# Patient Record
Sex: MALE | Race: WHITE | Employment: OTHER | URBAN - METROPOLITAN AREA
[De-identification: names, ages, dates, MRNs, and addresses within clinical notes are randomized per-mention and may not be internally consistent; named-entity substitution may affect disease eponyms.]

---

## 2024-02-09 ENCOUNTER — APPOINTMENT (OUTPATIENT)
Dept: GENERAL RADIOLOGY | Age: 74
DRG: 854 | End: 2024-02-09
Payer: MEDICARE

## 2024-02-09 ENCOUNTER — HOSPITAL ENCOUNTER (INPATIENT)
Age: 74
LOS: 13 days | Discharge: HOME | DRG: 854 | End: 2024-02-23
Attending: EMERGENCY MEDICINE | Admitting: INTERNAL MEDICINE
Payer: MEDICARE

## 2024-02-09 DIAGNOSIS — R65.20 SEVERE SEPSIS (HCC): Primary | ICD-10-CM

## 2024-02-09 DIAGNOSIS — I96 GANGRENE (HCC): ICD-10-CM

## 2024-02-09 DIAGNOSIS — L03.115 CELLULITIS OF RIGHT FOOT: ICD-10-CM

## 2024-02-09 DIAGNOSIS — M86.9 OSTEOMYELITIS OF THIRD TOE OF RIGHT FOOT (HCC): ICD-10-CM

## 2024-02-09 DIAGNOSIS — A41.9 SEVERE SEPSIS (HCC): Primary | ICD-10-CM

## 2024-02-09 DIAGNOSIS — I96 GANGRENE OF RIGHT FOOT (HCC): ICD-10-CM

## 2024-02-09 LAB
BASOPHILS # BLD: 0 K/UL (ref 0–0.2)
BASOPHILS NFR BLD: 0.1 %
DEPRECATED RDW RBC AUTO: 14.2 % (ref 12.4–15.4)
EOSINOPHIL # BLD: 0 K/UL (ref 0–0.6)
EOSINOPHIL NFR BLD: 0.1 %
ERYTHROCYTE [SEDIMENTATION RATE] IN BLOOD BY WESTERGREN METHOD: 107 MM/HR (ref 0–20)
HCT VFR BLD AUTO: 28.3 % (ref 40.5–52.5)
HGB BLD-MCNC: 9.2 G/DL (ref 13.5–17.5)
LACTATE BLDV-SCNC: 2.8 MMOL/L (ref 0.4–1.9)
LYMPHOCYTES # BLD: 1.6 K/UL (ref 1–5.1)
LYMPHOCYTES NFR BLD: 5.4 %
MCH RBC QN AUTO: 29.4 PG (ref 26–34)
MCHC RBC AUTO-ENTMCNC: 32.5 G/DL (ref 31–36)
MCV RBC AUTO: 90.5 FL (ref 80–100)
MONOCYTES # BLD: 2.5 K/UL (ref 0–1.3)
MONOCYTES NFR BLD: 8.6 %
NEUTROPHILS # BLD: 25.3 K/UL (ref 1.7–7.7)
NEUTROPHILS NFR BLD: 85.8 %
PLATELET # BLD AUTO: 670 K/UL (ref 135–450)
PMV BLD AUTO: 7.1 FL (ref 5–10.5)
RBC # BLD AUTO: 3.13 M/UL (ref 4.2–5.9)
WBC # BLD AUTO: 29.4 K/UL (ref 4–11)

## 2024-02-09 PROCEDURE — 86140 C-REACTIVE PROTEIN: CPT

## 2024-02-09 PROCEDURE — 99285 EMERGENCY DEPT VISIT HI MDM: CPT

## 2024-02-09 PROCEDURE — 73630 X-RAY EXAM OF FOOT: CPT

## 2024-02-09 PROCEDURE — 87040 BLOOD CULTURE FOR BACTERIA: CPT

## 2024-02-09 PROCEDURE — 85652 RBC SED RATE AUTOMATED: CPT

## 2024-02-09 PROCEDURE — 85025 COMPLETE CBC W/AUTO DIFF WBC: CPT

## 2024-02-09 PROCEDURE — 96365 THER/PROPH/DIAG IV INF INIT: CPT

## 2024-02-09 PROCEDURE — 80053 COMPREHEN METABOLIC PANEL: CPT

## 2024-02-09 PROCEDURE — 83605 ASSAY OF LACTIC ACID: CPT

## 2024-02-09 PROCEDURE — 6360000002 HC RX W HCPCS: Performed by: PHYSICIAN ASSISTANT

## 2024-02-09 PROCEDURE — 2580000003 HC RX 258: Performed by: PHYSICIAN ASSISTANT

## 2024-02-09 RX ORDER — 0.9 % SODIUM CHLORIDE 0.9 %
1000 INTRAVENOUS SOLUTION INTRAVENOUS ONCE
Status: DISCONTINUED | OUTPATIENT
Start: 2024-02-09 | End: 2024-02-10

## 2024-02-09 RX ADMIN — VANCOMYCIN HYDROCHLORIDE 1000 MG: 1 INJECTION, POWDER, LYOPHILIZED, FOR SOLUTION INTRAVENOUS at 23:48

## 2024-02-09 ASSESSMENT — PAIN SCALES - GENERAL: PAINLEVEL_OUTOF10: 6

## 2024-02-09 ASSESSMENT — PAIN - FUNCTIONAL ASSESSMENT: PAIN_FUNCTIONAL_ASSESSMENT: 0-10

## 2024-02-10 PROBLEM — D72.9 NEUTROPHILIA: Status: ACTIVE | Noted: 2024-02-10

## 2024-02-10 PROBLEM — F17.200 SMOKING: Status: ACTIVE | Noted: 2024-02-10

## 2024-02-10 PROBLEM — I73.9 PVD (PERIPHERAL VASCULAR DISEASE) (HCC): Status: ACTIVE | Noted: 2024-02-10

## 2024-02-10 PROBLEM — L02.619 CELLULITIS AND ABSCESS OF FOOT: Status: ACTIVE | Noted: 2024-02-10

## 2024-02-10 PROBLEM — L08.9 TYPE 2 DIABETES MELLITUS WITH RIGHT DIABETIC FOOT INFECTION (HCC): Status: ACTIVE | Noted: 2024-02-10

## 2024-02-10 PROBLEM — E11.9 DIABETES MELLITUS, NEW ONSET (HCC): Status: ACTIVE | Noted: 2024-02-10

## 2024-02-10 PROBLEM — R65.20 SEVERE SEPSIS (HCC): Status: ACTIVE | Noted: 2024-02-10

## 2024-02-10 PROBLEM — M86.9 OSTEOMYELITIS OF THIRD TOE OF RIGHT FOOT (HCC): Status: ACTIVE | Noted: 2024-02-10

## 2024-02-10 PROBLEM — A41.9 SEVERE SEPSIS (HCC): Status: ACTIVE | Noted: 2024-02-10

## 2024-02-10 PROBLEM — A41.9 SEPSIS (HCC): Status: ACTIVE | Noted: 2024-02-10

## 2024-02-10 PROBLEM — E11.628 TYPE 2 DIABETES MELLITUS WITH RIGHT DIABETIC FOOT INFECTION (HCC): Status: ACTIVE | Noted: 2024-02-10

## 2024-02-10 PROBLEM — L03.119 CELLULITIS AND ABSCESS OF FOOT: Status: ACTIVE | Noted: 2024-02-10

## 2024-02-10 PROBLEM — L03.115 CELLULITIS OF RIGHT FOOT: Status: ACTIVE | Noted: 2024-02-10

## 2024-02-10 PROBLEM — D72.828 NEUTROPHILIA: Status: ACTIVE | Noted: 2024-02-10

## 2024-02-10 LAB
ALBUMIN SERPL-MCNC: 2.3 G/DL (ref 3.4–5)
ALBUMIN SERPL-MCNC: 2.7 G/DL (ref 3.4–5)
ALBUMIN/GLOB SERPL: 0.5 {RATIO} (ref 1.1–2.2)
ALBUMIN/GLOB SERPL: 0.5 {RATIO} (ref 1.1–2.2)
ALP SERPL-CCNC: 163 U/L (ref 40–129)
ALP SERPL-CCNC: 217 U/L (ref 40–129)
ALT SERPL-CCNC: 20 U/L (ref 10–40)
ALT SERPL-CCNC: 24 U/L (ref 10–40)
ANION GAP SERPL CALCULATED.3IONS-SCNC: 12 MMOL/L (ref 3–16)
ANION GAP SERPL CALCULATED.3IONS-SCNC: 9 MMOL/L (ref 3–16)
AST SERPL-CCNC: 26 U/L (ref 15–37)
AST SERPL-CCNC: 36 U/L (ref 15–37)
BASOPHILS # BLD: 0 K/UL (ref 0–0.2)
BASOPHILS NFR BLD: 0.1 %
BILIRUB SERPL-MCNC: 0.8 MG/DL (ref 0–1)
BILIRUB SERPL-MCNC: 0.8 MG/DL (ref 0–1)
BUN SERPL-MCNC: 26 MG/DL (ref 7–20)
BUN SERPL-MCNC: 26 MG/DL (ref 7–20)
CALCIUM SERPL-MCNC: 8.4 MG/DL (ref 8.3–10.6)
CALCIUM SERPL-MCNC: 9.1 MG/DL (ref 8.3–10.6)
CHLORIDE SERPL-SCNC: 103 MMOL/L (ref 99–110)
CHLORIDE SERPL-SCNC: 99 MMOL/L (ref 99–110)
CO2 SERPL-SCNC: 27 MMOL/L (ref 21–32)
CO2 SERPL-SCNC: 28 MMOL/L (ref 21–32)
CREAT SERPL-MCNC: 0.8 MG/DL (ref 0.8–1.3)
CREAT SERPL-MCNC: 0.9 MG/DL (ref 0.8–1.3)
CRP SERPL-MCNC: 288.8 MG/L (ref 0–5.1)
DEPRECATED RDW RBC AUTO: 13.9 % (ref 12.4–15.4)
EKG ATRIAL RATE: 103 BPM
EKG DIAGNOSIS: NORMAL
EKG P AXIS: 79 DEGREES
EKG P-R INTERVAL: 144 MS
EKG Q-T INTERVAL: 356 MS
EKG QRS DURATION: 100 MS
EKG QTC CALCULATION (BAZETT): 466 MS
EKG R AXIS: 37 DEGREES
EKG T AXIS: 65 DEGREES
EKG VENTRICULAR RATE: 103 BPM
EOSINOPHIL # BLD: 0 K/UL (ref 0–0.6)
EOSINOPHIL NFR BLD: 0 %
EST. AVERAGE GLUCOSE BLD GHB EST-MCNC: 165.7 MG/DL
GFR SERPLBLD CREATININE-BSD FMLA CKD-EPI: >60 ML/MIN/{1.73_M2}
GFR SERPLBLD CREATININE-BSD FMLA CKD-EPI: >60 ML/MIN/{1.73_M2}
GLUCOSE BLD-MCNC: 172 MG/DL (ref 70–99)
GLUCOSE BLD-MCNC: 234 MG/DL (ref 70–99)
GLUCOSE BLD-MCNC: 242 MG/DL (ref 70–99)
GLUCOSE BLD-MCNC: 247 MG/DL (ref 70–99)
GLUCOSE SERPL-MCNC: 157 MG/DL (ref 70–99)
GLUCOSE SERPL-MCNC: 289 MG/DL (ref 70–99)
HBA1C MFR BLD: 7.4 %
HCT VFR BLD AUTO: 24.5 % (ref 40.5–52.5)
HGB BLD-MCNC: 7.9 G/DL (ref 13.5–17.5)
LACTATE BLDV-SCNC: 1.3 MMOL/L (ref 0.4–1.9)
LACTATE BLDV-SCNC: 1.5 MMOL/L (ref 0.4–1.9)
LYMPHOCYTES # BLD: 1.1 K/UL (ref 1–5.1)
LYMPHOCYTES NFR BLD: 3.7 %
MCH RBC QN AUTO: 29.6 PG (ref 26–34)
MCHC RBC AUTO-ENTMCNC: 32.4 G/DL (ref 31–36)
MCV RBC AUTO: 91.2 FL (ref 80–100)
MONOCYTES # BLD: 2.2 K/UL (ref 0–1.3)
MONOCYTES NFR BLD: 7.4 %
NEUTROPHILS # BLD: 25.9 K/UL (ref 1.7–7.7)
NEUTROPHILS NFR BLD: 88.8 %
PERFORMED ON: ABNORMAL
PLATELET # BLD AUTO: 542 K/UL (ref 135–450)
PMV BLD AUTO: 7.1 FL (ref 5–10.5)
POTASSIUM SERPL-SCNC: 3.9 MMOL/L (ref 3.5–5.1)
POTASSIUM SERPL-SCNC: 4.4 MMOL/L (ref 3.5–5.1)
PROT SERPL-MCNC: 6.9 G/DL (ref 6.4–8.2)
PROT SERPL-MCNC: 8 G/DL (ref 6.4–8.2)
RBC # BLD AUTO: 2.69 M/UL (ref 4.2–5.9)
SODIUM SERPL-SCNC: 139 MMOL/L (ref 136–145)
SODIUM SERPL-SCNC: 139 MMOL/L (ref 136–145)
VANCOMYCIN SERPL-MCNC: 8.2 UG/ML
WBC # BLD AUTO: 29.2 K/UL (ref 4–11)

## 2024-02-10 PROCEDURE — 2580000003 HC RX 258: Performed by: STUDENT IN AN ORGANIZED HEALTH CARE EDUCATION/TRAINING PROGRAM

## 2024-02-10 PROCEDURE — 85025 COMPLETE CBC W/AUTO DIFF WBC: CPT

## 2024-02-10 PROCEDURE — 83036 HEMOGLOBIN GLYCOSYLATED A1C: CPT

## 2024-02-10 PROCEDURE — 6370000000 HC RX 637 (ALT 250 FOR IP): Performed by: STUDENT IN AN ORGANIZED HEALTH CARE EDUCATION/TRAINING PROGRAM

## 2024-02-10 PROCEDURE — 87186 SC STD MICRODIL/AGAR DIL: CPT

## 2024-02-10 PROCEDURE — 2580000003 HC RX 258: Performed by: PHYSICIAN ASSISTANT

## 2024-02-10 PROCEDURE — 80053 COMPREHEN METABOLIC PANEL: CPT

## 2024-02-10 PROCEDURE — 83605 ASSAY OF LACTIC ACID: CPT

## 2024-02-10 PROCEDURE — 99223 1ST HOSP IP/OBS HIGH 75: CPT | Performed by: INTERNAL MEDICINE

## 2024-02-10 PROCEDURE — 6360000002 HC RX W HCPCS: Performed by: STUDENT IN AN ORGANIZED HEALTH CARE EDUCATION/TRAINING PROGRAM

## 2024-02-10 PROCEDURE — 87070 CULTURE OTHR SPECIMN AEROBIC: CPT

## 2024-02-10 PROCEDURE — 6370000000 HC RX 637 (ALT 250 FOR IP): Performed by: PHYSICIAN ASSISTANT

## 2024-02-10 PROCEDURE — 6360000002 HC RX W HCPCS: Performed by: INTERNAL MEDICINE

## 2024-02-10 PROCEDURE — 93010 ELECTROCARDIOGRAM REPORT: CPT | Performed by: INTERNAL MEDICINE

## 2024-02-10 PROCEDURE — 87205 SMEAR GRAM STAIN: CPT

## 2024-02-10 PROCEDURE — 6360000002 HC RX W HCPCS: Performed by: PHYSICIAN ASSISTANT

## 2024-02-10 PROCEDURE — 87075 CULTR BACTERIA EXCEPT BLOOD: CPT

## 2024-02-10 PROCEDURE — 36415 COLL VENOUS BLD VENIPUNCTURE: CPT

## 2024-02-10 PROCEDURE — 87077 CULTURE AEROBIC IDENTIFY: CPT

## 2024-02-10 PROCEDURE — 1200000000 HC SEMI PRIVATE

## 2024-02-10 PROCEDURE — 80202 ASSAY OF VANCOMYCIN: CPT

## 2024-02-10 PROCEDURE — 93005 ELECTROCARDIOGRAM TRACING: CPT | Performed by: STUDENT IN AN ORGANIZED HEALTH CARE EDUCATION/TRAINING PROGRAM

## 2024-02-10 RX ORDER — ACETAMINOPHEN 325 MG/1
650 TABLET ORAL EVERY 6 HOURS PRN
Status: DISCONTINUED | OUTPATIENT
Start: 2024-02-10 | End: 2024-02-23 | Stop reason: HOSPADM

## 2024-02-10 RX ORDER — NICOTINE 21 MG/24HR
1 PATCH, TRANSDERMAL 24 HOURS TRANSDERMAL ONCE
Status: COMPLETED | OUTPATIENT
Start: 2024-02-10 | End: 2024-02-11

## 2024-02-10 RX ORDER — SODIUM CHLORIDE 0.9 % (FLUSH) 0.9 %
5-40 SYRINGE (ML) INJECTION EVERY 12 HOURS SCHEDULED
Status: DISCONTINUED | OUTPATIENT
Start: 2024-02-10 | End: 2024-02-23 | Stop reason: HOSPADM

## 2024-02-10 RX ORDER — IPRATROPIUM BROMIDE AND ALBUTEROL SULFATE 2.5; .5 MG/3ML; MG/3ML
1 SOLUTION RESPIRATORY (INHALATION)
Status: DISCONTINUED | OUTPATIENT
Start: 2024-02-10 | End: 2024-02-10

## 2024-02-10 RX ORDER — ACETAMINOPHEN 650 MG/1
650 SUPPOSITORY RECTAL EVERY 6 HOURS PRN
Status: DISCONTINUED | OUTPATIENT
Start: 2024-02-10 | End: 2024-02-23 | Stop reason: HOSPADM

## 2024-02-10 RX ORDER — SODIUM CHLORIDE 9 MG/ML
INJECTION, SOLUTION INTRAVENOUS CONTINUOUS
Status: ACTIVE | OUTPATIENT
Start: 2024-02-10 | End: 2024-02-11

## 2024-02-10 RX ORDER — METRONIDAZOLE 500 MG/100ML
500 INJECTION, SOLUTION INTRAVENOUS EVERY 8 HOURS SCHEDULED
Status: DISCONTINUED | OUTPATIENT
Start: 2024-02-10 | End: 2024-02-15

## 2024-02-10 RX ORDER — IPRATROPIUM BROMIDE AND ALBUTEROL SULFATE 2.5; .5 MG/3ML; MG/3ML
1 SOLUTION RESPIRATORY (INHALATION)
Status: DISCONTINUED | OUTPATIENT
Start: 2024-02-10 | End: 2024-02-11

## 2024-02-10 RX ORDER — 0.9 % SODIUM CHLORIDE 0.9 %
30 INTRAVENOUS SOLUTION INTRAVENOUS ONCE
Status: COMPLETED | OUTPATIENT
Start: 2024-02-10 | End: 2024-02-10

## 2024-02-10 RX ORDER — SODIUM CHLORIDE 0.9 % (FLUSH) 0.9 %
5-40 SYRINGE (ML) INJECTION PRN
Status: DISCONTINUED | OUTPATIENT
Start: 2024-02-10 | End: 2024-02-23 | Stop reason: HOSPADM

## 2024-02-10 RX ORDER — DEXTROSE MONOHYDRATE 100 MG/ML
INJECTION, SOLUTION INTRAVENOUS CONTINUOUS PRN
Status: DISCONTINUED | OUTPATIENT
Start: 2024-02-10 | End: 2024-02-23 | Stop reason: HOSPADM

## 2024-02-10 RX ORDER — MORPHINE SULFATE 4 MG/ML
4 INJECTION, SOLUTION INTRAMUSCULAR; INTRAVENOUS
Status: DISCONTINUED | OUTPATIENT
Start: 2024-02-10 | End: 2024-02-11

## 2024-02-10 RX ORDER — SODIUM CHLORIDE 9 MG/ML
INJECTION, SOLUTION INTRAVENOUS PRN
Status: DISCONTINUED | OUTPATIENT
Start: 2024-02-10 | End: 2024-02-23 | Stop reason: HOSPADM

## 2024-02-10 RX ORDER — POLYETHYLENE GLYCOL 3350 17 G/17G
17 POWDER, FOR SOLUTION ORAL DAILY PRN
Status: DISCONTINUED | OUTPATIENT
Start: 2024-02-10 | End: 2024-02-23 | Stop reason: HOSPADM

## 2024-02-10 RX ORDER — GLUCAGON 1 MG/ML
1 KIT INJECTION PRN
Status: DISCONTINUED | OUTPATIENT
Start: 2024-02-10 | End: 2024-02-23 | Stop reason: HOSPADM

## 2024-02-10 RX ORDER — INSULIN LISPRO 100 [IU]/ML
0-4 INJECTION, SOLUTION INTRAVENOUS; SUBCUTANEOUS
Status: DISCONTINUED | OUTPATIENT
Start: 2024-02-10 | End: 2024-02-23 | Stop reason: HOSPADM

## 2024-02-10 RX ORDER — FLUTICASONE PROPIONATE 50 MCG
1 SPRAY, SUSPENSION (ML) NASAL DAILY
Status: DISCONTINUED | OUTPATIENT
Start: 2024-02-10 | End: 2024-02-23 | Stop reason: HOSPADM

## 2024-02-10 RX ORDER — ONDANSETRON 2 MG/ML
4 INJECTION INTRAMUSCULAR; INTRAVENOUS EVERY 6 HOURS PRN
Status: DISCONTINUED | OUTPATIENT
Start: 2024-02-10 | End: 2024-02-23 | Stop reason: HOSPADM

## 2024-02-10 RX ORDER — ENOXAPARIN SODIUM 100 MG/ML
40 INJECTION SUBCUTANEOUS DAILY
Status: DISCONTINUED | OUTPATIENT
Start: 2024-02-10 | End: 2024-02-12

## 2024-02-10 RX ORDER — NICOTINE 21 MG/24HR
1 PATCH, TRANSDERMAL 24 HOURS TRANSDERMAL DAILY
Status: DISCONTINUED | OUTPATIENT
Start: 2024-02-10 | End: 2024-02-23 | Stop reason: HOSPADM

## 2024-02-10 RX ORDER — INSULIN LISPRO 100 [IU]/ML
0-4 INJECTION, SOLUTION INTRAVENOUS; SUBCUTANEOUS NIGHTLY
Status: DISCONTINUED | OUTPATIENT
Start: 2024-02-10 | End: 2024-02-23 | Stop reason: HOSPADM

## 2024-02-10 RX ORDER — MORPHINE SULFATE 2 MG/ML
2 INJECTION, SOLUTION INTRAMUSCULAR; INTRAVENOUS
Status: DISCONTINUED | OUTPATIENT
Start: 2024-02-10 | End: 2024-02-11

## 2024-02-10 RX ORDER — ONDANSETRON 4 MG/1
4 TABLET, ORALLY DISINTEGRATING ORAL EVERY 8 HOURS PRN
Status: DISCONTINUED | OUTPATIENT
Start: 2024-02-10 | End: 2024-02-23 | Stop reason: HOSPADM

## 2024-02-10 RX ADMIN — PIPERACILLIN AND TAZOBACTAM 4500 MG: 4; .5 INJECTION, POWDER, FOR SOLUTION INTRAVENOUS at 03:12

## 2024-02-10 RX ADMIN — CEFEPIME 2000 MG: 2 INJECTION, POWDER, FOR SOLUTION INTRAVENOUS at 10:59

## 2024-02-10 RX ADMIN — POLYETHYLENE GLYCOL 3350 17 G: 17 POWDER, FOR SOLUTION ORAL at 10:59

## 2024-02-10 RX ADMIN — MORPHINE SULFATE 2 MG: 2 INJECTION, SOLUTION INTRAMUSCULAR; INTRAVENOUS at 08:35

## 2024-02-10 RX ADMIN — MORPHINE SULFATE 2 MG: 2 INJECTION, SOLUTION INTRAMUSCULAR; INTRAVENOUS at 18:40

## 2024-02-10 RX ADMIN — SODIUM CHLORIDE, PRESERVATIVE FREE 10 ML: 5 INJECTION INTRAVENOUS at 21:47

## 2024-02-10 RX ADMIN — MORPHINE SULFATE 2 MG: 2 INJECTION, SOLUTION INTRAMUSCULAR; INTRAVENOUS at 21:48

## 2024-02-10 RX ADMIN — METRONIDAZOLE 500 MG: 500 INJECTION, SOLUTION INTRAVENOUS at 17:33

## 2024-02-10 RX ADMIN — MORPHINE SULFATE 4 MG: 4 INJECTION, SOLUTION INTRAMUSCULAR; INTRAVENOUS at 11:40

## 2024-02-10 RX ADMIN — VANCOMYCIN HYDROCHLORIDE 750 MG: 750 INJECTION, POWDER, LYOPHILIZED, FOR SOLUTION INTRAVENOUS at 11:52

## 2024-02-10 RX ADMIN — MORPHINE SULFATE 2 MG: 2 INJECTION, SOLUTION INTRAMUSCULAR; INTRAVENOUS at 06:16

## 2024-02-10 RX ADMIN — CEFEPIME 2000 MG: 2 INJECTION, POWDER, FOR SOLUTION INTRAVENOUS at 18:45

## 2024-02-10 RX ADMIN — FLUTICASONE PROPIONATE 1 SPRAY: 50 SPRAY, METERED NASAL at 08:04

## 2024-02-10 RX ADMIN — SODIUM CHLORIDE: 9 INJECTION, SOLUTION INTRAVENOUS at 21:55

## 2024-02-10 RX ADMIN — METRONIDAZOLE 500 MG: 500 INJECTION, SOLUTION INTRAVENOUS at 21:56

## 2024-02-10 RX ADMIN — MORPHINE SULFATE 2 MG: 2 INJECTION, SOLUTION INTRAMUSCULAR; INTRAVENOUS at 15:23

## 2024-02-10 RX ADMIN — SODIUM CHLORIDE 2040 ML: 9 INJECTION, SOLUTION INTRAVENOUS at 00:17

## 2024-02-10 RX ADMIN — ENOXAPARIN SODIUM 40 MG: 100 INJECTION SUBCUTANEOUS at 08:06

## 2024-02-10 ASSESSMENT — PAIN DESCRIPTION - ORIENTATION
ORIENTATION: RIGHT
ORIENTATION: LOWER;RIGHT
ORIENTATION: RIGHT

## 2024-02-10 ASSESSMENT — PAIN SCALES - GENERAL
PAINLEVEL_OUTOF10: 4
PAINLEVEL_OUTOF10: 7
PAINLEVEL_OUTOF10: 5
PAINLEVEL_OUTOF10: 6
PAINLEVEL_OUTOF10: 6
PAINLEVEL_OUTOF10: 5
PAINLEVEL_OUTOF10: 5
PAINLEVEL_OUTOF10: 8
PAINLEVEL_OUTOF10: 3
PAINLEVEL_OUTOF10: 2

## 2024-02-10 ASSESSMENT — PAIN DESCRIPTION - LOCATION
LOCATION: LEG
LOCATION: LEG
LOCATION: FOOT
LOCATION: FOOT

## 2024-02-10 ASSESSMENT — PAIN DESCRIPTION - ONSET: ONSET: ON-GOING

## 2024-02-10 ASSESSMENT — PAIN DESCRIPTION - DESCRIPTORS
DESCRIPTORS: ACHING
DESCRIPTORS: BURNING;ACHING

## 2024-02-10 ASSESSMENT — PAIN DESCRIPTION - PAIN TYPE: TYPE: ACUTE PAIN

## 2024-02-10 ASSESSMENT — PAIN - FUNCTIONAL ASSESSMENT: PAIN_FUNCTIONAL_ASSESSMENT: PREVENTS OR INTERFERES SOME ACTIVE ACTIVITIES AND ADLS

## 2024-02-10 ASSESSMENT — PAIN DESCRIPTION - FREQUENCY: FREQUENCY: CONTINUOUS

## 2024-02-10 NOTE — ED PROVIDER NOTES
[facility]  ED  EMERGENCY DEPARTMENT ENCOUNTER        Pt Name: Jackson Martínez  MRN: [id number]  Birthdate 1950  Date of evaluation: 2/9/2024  Provider: FRANKI HERNANDEZ PA-C  PCP: No primary care provider on file.  ED Attending: Laura Ramirez MD       I have seen and evaluated this patient with my supervising physician Laura Ramirez MD.    CHIEF COMPLAINT:     Chief Complaint   Patient presents with    Wound Infection     A gangrene toe/foot right.  Pt states he used toenail clippers on second toe 2 weeks ago now a \"bad infection\".         HISTORY OF PRESENT ILLNESS:      History provided by the patient. No limitations.    Jackson Martínez is a 73 y.o. male who arrives to the ED by EMS from home.  Patient lives with his wife.  Patient is here with pain, swelling, discoloration, drainage and foul smell from right foot.  He reports this has been going on for a few weeks.  He has been applying antibiotic ointment and trying to keep it clean.  On arrival he has dried, crusted paper towels tucked between his toes.  He is concerned about \"gangrene\".  Patient denies medical history though admittedly has not been to a doctor in about a decade.     Nursing Notes were reviewed     REVIEW OF SYSTEMS:     Review of Systems  Positives and pertinent negatives as per HPI.      PAST MEDICAL HISTORY:     Past Medical History:   Diagnosis Date    Bronchitis        SURGICAL HISTORY:      Past Surgical History:   Procedure Laterality Date    ADENOIDECTOMY      NASAL SEPTUM SURGERY      SPLENECTOMY      TONSILLECTOMY         CURRENT MEDICATIONS:       Previous Medications    FLUTICASONE (FLONASE) 50 MCG/ACT NASAL SPRAY    1 spray by Nasal route daily.    FLUTICASONE-SALMETEROL (ADVAIR) 100-50 MCG/DOSE DISKUS INHALER    Inhale 1 puff into the lungs every 12 hours.    IPRATROPIUM-ALBUTEROL (DUONEB) 0.5-2.5 (3) MG/3ML SOLN NEBULIZER SOLUTION    Inhale 3 mLs into the lungs every 4 hours.    METHOCARBAMOL

## 2024-02-10 NOTE — CONSULTS
Pharmacy Note  Vancomycin Consult    Jackson Martínez is a 73 y.o. male started on Vancomycin for SSTI; consult received from Dr. Hardin to manage therapy. Also receiving the following antibiotics: zosyn.    Allergies:  Flexeril [cyclobenzaprine] and Tramadol       Recent Labs     02/09/24  2335   CREATININE 0.9       Recent Labs     02/09/24  2335   WBC 29.4*       Estimated Creatinine Clearance: 71 mL/min (based on SCr of 0.9 mg/dL).    No intake or output data in the 24 hours ending 02/10/24 0349    Wt Readings from Last 1 Encounters:   02/09/24 68 kg (150 lb)         Body mass index is 22.15 kg/m².    Loading dose (critically ill or in ICU, require dialysis or renal replacement therapy): Vancomycin 25 mg/kg IVPB x 1 (maximum 2500 mg).  Maintenance dose: 10-20 mg/kg (maximum: 2000 mg/dose and 4500 mg/day) starting at the next dosing interval determined by renal function  Pulse dose: fluctuating renal function, PRINCESS, ESRD  CRRT: 7.5-10 mg/kg q12h   Goal Vancomycin trough: 15-20 mcg/mL   Goal Vancomycin AUC: 400-600     Assessment/Plan:  Will initiate Vancomycin with a one time loading dose of 1000 mg x1, followed by 750 mg IV every 12 hours. Calculated Vancomycin AUC = 447 mg/L*h with an estimated steady-state vancomycin trough = 12.8 mcg/mL. Vancomycin level ordered for 2100. Timing of trough level will be determined based on culture results, renal function, and clinical response.     Gaurang Higgins PharmD 2/10/2024  3:50 AM

## 2024-02-10 NOTE — RT PROTOCOL NOTE
RT Inhaler-Nebulizer Bronchodilator Protocol Note    There is a bronchodilator order in the chart from a provider indicating to follow the RT Bronchodilator Protocol and there is an “Initiate RT Inhaler-Nebulizer Bronchodilator Protocol” order as well (see protocol at bottom of note).    CXR Findings:  No results found.    The findings from the last RT Protocol Assessment were as follows:   History Pulmonary Disease: Smoker 15 pack years or more  Respiratory Pattern: Regular pattern and RR 12-20 bpm  Breath Sounds: Slightly diminished and/or crackles  Cough: Strong, spontaneous, non-productive  Indication for Bronchodilator Therapy: On home bronchodilators  Bronchodilator Assessment Score: 3    Aerosolized bronchodilator medication orders have been revised according to the RT Inhaler-Nebulizer Bronchodilator Protocol below.    Respiratory Therapist to perform RT Therapy Protocol Assessment initially then follow the protocol.  Repeat RT Therapy Protocol Assessment PRN for score 0-3 or on second treatment, BID, and PRN for scores above 3.    No Indications - adjust the frequency to every 6 hours PRN wheezing or bronchospasm, if no treatments needed after 48 hours then discontinue using Per Protocol order mode.     If indication present, adjust the RT bronchodilator orders based on the Bronchodilator Assessment Score as indicated below.  Use Inhaler orders unless patient has one or more of the following: on home nebulizer, not able to hold breath for 10 seconds, is not alert and oriented, cannot activate and use MDI correctly, or respiratory rate 25 breaths per minute or more, then use the equivalent nebulizer order(s) with same Frequency and PRN reasons based on the score.  If a patient is on this medication at home then do not decrease Frequency below that used at home.    0-3 - enter or revise RT bronchodilator order(s) to equivalent RT Bronchodilator order with Frequency of every 4 hours PRN for wheezing or  increased work of breathing using Per Protocol order mode.        4-6 - enter or revise RT Bronchodilator order(s) to two equivalent RT bronchodilator orders with one order with BID Frequency and one order with Frequency of every 4 hours PRN wheezing or increased work of breathing using Per Protocol order mode.        7-10 - enter or revise RT Bronchodilator order(s) to two equivalent RT bronchodilator orders with one order with TID Frequency and one order with Frequency of every 4 hours PRN wheezing or increased work of breathing using Per Protocol order mode.       11-13 - enter or revise RT Bronchodilator order(s) to one equivalent RT bronchodilator order with QID Frequency and an Albuterol order with Frequency of every 4 hours PRN wheezing or increased work of breathing using Per Protocol order mode.      Greater than 13 - enter or revise RT Bronchodilator order(s) to one equivalent RT bronchodilator order with every 4 hours Frequency and an Albuterol order with Frequency of every 2 hours PRN wheezing or increased work of breathing using Per Protocol order mode.     RT to enter RT Home Evaluation for COPD & MDI Assessment order using Per Protocol order mode.    Electronically signed by william zapata RCP on 2/10/2024 at 4:30 AM

## 2024-02-10 NOTE — CONSULTS
Infectious Diseases Inpatient Consult Note      Reason for Consult:  Sepsis, complicated Rt diabetic foot infection and osteomyelitis     Requesting Physician:  Dr. POONAM Parsons     Primary Care Physician:  No primary care provider on file.    History Obtained From:  Epic and pt     CHIEF COMPLAINT:     Chief Complaint   Patient presents with    Wound Infection     A gangrene toe/foot right.  Pt states he used toenail clippers on second toe 2 weeks ago now a \"bad infection\".           HISTORY OF PRESENT ILLNESS:  73 y.o. man with a history of hypertension, smoking, admitted to hospital secondary to right foot infection after using toenail clippers on the third toenail developed a wound now progressively worsening pain redness drainage difficulty weightbearing.  Progressed with redness and swelling concern for worsening infection present to the hospital for further evaluation diagnosed to have diabetes, labs indicate creatinine 0.9 lactic acid 1.3  hemoglobin A1c 7.4 WBC 29.2 hemoglobin 7.9  blood culture in processX-ray of the right foot indicate bony resorption of the third toe concerning for osteomyelitis.  Given the ongoing sepsis and complicated right diabetic foot infection we are consulted for recommendations  Location : Right foot pain swelling aching      Quality : Aching      Severity :   10/10  Duration : 2 weeks     Timing : Constant  Context : New onset of diabetes  Modifying factors : None  Associated signs and symptoms: Right foot swelling, redness, pain, drainage        Past Medical History:    Past Medical History:   Diagnosis Date    Bronchitis        Past Surgical History:    Past Surgical History:   Procedure Laterality Date    ADENOIDECTOMY      NASAL SEPTUM SURGERY      SPLENECTOMY      TONSILLECTOMY         Current Medications:    No outpatient medications have been marked as taking for the 2/9/24 encounter (Hospital Encounter).       Allergies:  Flexeril [cyclobenzaprine]

## 2024-02-10 NOTE — PROGRESS NOTES
Patient admitted overnight by my colleague and agree with the assessment and plan as documented.  He is to be continued on broad-spectrum antibiotics.  Patient seen by podiatry in the interim.  MRI is ordered as well as vascular studies..    Holland Hdez MD  Hospitalist

## 2024-02-10 NOTE — H&P
includes Splenectomy; Nasal septum surgery; Tonsillectomy; and Adenoidectomy.  Allergies:   Allergies   Allergen Reactions    Flexeril [Cyclobenzaprine]      Make drowsy and dry    Tramadol Nausea Only     Fam HX:  family history is not on file.  Soc HX:   Social History     Socioeconomic History    Marital status:    Tobacco Use    Smoking status: Every Day     Current packs/day: 0.50     Types: Cigarettes   Substance and Sexual Activity    Alcohol use: No    Drug use: No     Social Determinants of Health     Food Insecurity: No Food Insecurity (2/10/2024)    Hunger Vital Sign     Worried About Running Out of Food in the Last Year: Never true     Ran Out of Food in the Last Year: Never true   Transportation Needs: No Transportation Needs (2/10/2024)    PRAPARE - Transportation     Lack of Transportation (Medical): No     Lack of Transportation (Non-Medical): No   Housing Stability: Low Risk  (2/10/2024)    Housing Stability Vital Sign     Unable to Pay for Housing in the Last Year: No     Number of Places Lived in the Last Year: 1     Unstable Housing in the Last Year: No       Medications:   Medications:    nicotine  1 patch TransDERmal Once    sodium chloride flush  5-40 mL IntraVENous 2 times per day    nicotine  1 patch TransDERmal Daily    enoxaparin  40 mg SubCUTAneous Daily    piperacillin-tazobactam  3,375 mg IntraVENous q8h    fluticasone  1 spray Nasal Daily    mometasone-formoterol  2 puff Inhalation BID RT    ipratropium 0.5 mg-albuterol 2.5 mg  1 Dose Inhalation Q4H WA RT      Infusions:    sodium chloride      sodium chloride       PRN Meds: sodium chloride flush, 5-40 mL, PRN  sodium chloride, , PRN  ondansetron, 4 mg, Q8H PRN   Or  ondansetron, 4 mg, Q6H PRN  polyethylene glycol, 17 g, Daily PRN  acetaminophen, 650 mg, Q6H PRN   Or  acetaminophen, 650 mg, Q6H PRN  morphine, 2 mg, Q2H PRN   Or  morphine, 4 mg, Q2H PRN        Labs      CBC:   Recent Labs     02/09/24  2335   WBC 29.4*   HGB

## 2024-02-10 NOTE — PROGRESS NOTES
4 Eyes Skin Assessment     NAME:  Jackson Martínez  YOB: 1950  MEDICAL RECORD NUMBER:  [id number]    The patient is being assessed for  Admission    I agree that at least one RN has performed a thorough Head to Toe Skin Assessment on the patient. ALL assessment sites listed below have been assessed.      Areas assessed by both nurses:    Head, Face, Ears, Shoulders, Back, Chest, Arms, Elbows, Hands, Sacrum. Buttock, Coccyx, Ischium, and Legs. Feet and Heels        Does the Patient have a Wound? Yes wound(s) were present on assessment. LDA wound assessment was Initiated and completed by RN                     Jose Prevention initiated by RN: Yes  Wound Care Orders initiated by RN: Yes    Pressure Injury (Stage 3,4, Unstageable, DTI, NWPT, and Complex wounds) if present, place Wound referral order by RN under : Yes    New Ostomies, if present place, Ostomy referral order under : No     Nurse 1 eSignature: Electronically signed by Sascha Kaur RN on 2/10/24 at 3:00 AM EST    **SHARE this note so that the co-signing nurse can place an eSignature**    Nurse 2 eSignature: Electronically signed by Viky Leiva RN on 2/10/24 at 3:57 AM EST

## 2024-02-10 NOTE — CONSULTS
Consult Placed     Who: GAGE GRANT   Date:02/10/24  Time:1020     Electronically signed by Shekhar Josue on 2/10/2024 at 10:20 AM

## 2024-02-10 NOTE — CONSULTS
Department of Podiatry  Attending Consult Note      Reason for Consult:  Right foot infection and sepsis  Requesting Physician:  Orestes Moraes MD    CHIEF COMPLAINT:  Right foot infection    HISTORY OF PRESENT ILLNESS:                The patient is a 73 y.o. male with significant past medical history of tobacco abuse who presents with an infection to his right foot.  Patient states he believes he nicked the toe while attempting to cut his nails a couple of weeks ago.  Patient states the site got progressively redder and more swollen in recent days and had been taking tylenol for pain.  Patient was transported to the hospital by EMS with signs and symptoms of sepsis.  Patient states he is currently feeling better than he was yesterday.      Past Medical History:        Diagnosis Date    Bronchitis      Past Surgical History:        Procedure Laterality Date    ADENOIDECTOMY      NASAL SEPTUM SURGERY      SPLENECTOMY      TONSILLECTOMY       Current Medications:    Current Facility-Administered Medications: nicotine (NICODERM CQ) 21 MG/24HR 1 patch, 1 patch, TransDERmal, Once  sodium chloride flush 0.9 % injection 5-40 mL, 5-40 mL, IntraVENous, 2 times per day  sodium chloride flush 0.9 % injection 5-40 mL, 5-40 mL, IntraVENous, PRN  0.9 % sodium chloride infusion, , IntraVENous, PRN  ondansetron (ZOFRAN-ODT) disintegrating tablet 4 mg, 4 mg, Oral, Q8H PRN **OR** ondansetron (ZOFRAN) injection 4 mg, 4 mg, IntraVENous, Q6H PRN  polyethylene glycol (GLYCOLAX) packet 17 g, 17 g, Oral, Daily PRN  acetaminophen (TYLENOL) tablet 650 mg, 650 mg, Oral, Q6H PRN **OR** acetaminophen (TYLENOL) suppository 650 mg, 650 mg, Rectal, Q6H PRN  nicotine (NICODERM CQ) 21 MG/24HR 1 patch, 1 patch, TransDERmal, Daily  enoxaparin (LOVENOX) injection 40 mg, 40 mg, SubCUTAneous, Daily  morphine (PF) injection 2 mg, 2 mg, IntraVENous, Q2H PRN **OR** morphine sulfate (PF) injection 4 mg, 4 mg, IntraVENous, Q2H PRN  0.9 % sodium chloride  pedal hair growth.    Thin and dry skin bilateral.  Thick yellow and crumbly nails 1-5 bilateral  Vasc:  Non palpation DP pulse right and barely palpable PT pulse right.  CFT>5 seconds in all digits.    Neuro:  Gross sensation diminished bilateral  Ortho:  Unstable third toe right foot with manipulation.      IMPRESSION/RECOMMENDATIONS:       Osteomyelitis right third toe with sepsis :  The resorption of the bone of the third toe is indicative of osteomyelitis and has likely been present for longer than patient relates in his history.  I will send him for an MRI to determine if there is a deep space abscess more proximally in the foot.  Discussed with patient amputation of the third to and possible extensive Incision and Drainage.  Patient states he understands.    Peripheral Arterial disease?  Given patient's nonpalpable pulses, smoking history and impending surgical intervention, I will send him for vascular studies and refer to vascular surgery if necessary.    Peripheral Neuropathy

## 2024-02-10 NOTE — PROGRESS NOTES
Patient admitted to room 363 from ED.  Patient oriented to room, call light, bed rails, phone, lights and bathroom.  Patient instructed about the schedule of the day including: vital sign frequency, lab draws, possible tests, frequency of MD and staff rounds, including RN/MD rounding together at bedside, daily weights, and I &O's.  Patient instructed about prescribed diet, how to use 8MENU, and television.  With bed alarm in place, patient aware of placement and reason.  With Telemetry box 25 in place, patient aware of placement and reason.  Bed locked, in lowest position, side rails up 2/4, call light within reach.  Will continue to monitor.

## 2024-02-10 NOTE — CONSULTS
Pharmacy Note  Vancomycin Consult    Jackson Martínez is a 73 y.o. male started on Vancomycin for Osteomyelitis ; consult received from Dr. Hardin to manage therapy. Also receiving the following antibiotics: zosyn.    Allergies:  Flexeril [cyclobenzaprine] and Tramadol       Recent Labs     02/09/24  2335   CREATININE 0.9       Recent Labs     02/09/24  2335   WBC 29.4*       Estimated Creatinine Clearance: 71 mL/min (based on SCr of 0.9 mg/dL).    No intake or output data in the 24 hours ending 02/10/24 0349    Wt Readings from Last 1 Encounters:   02/09/24 68 kg (150 lb)         Body mass index is 22.15 kg/m².    Loading dose (critically ill or in ICU, require dialysis or renal replacement therapy): Vancomycin 25 mg/kg IVPB x 1 (maximum 2500 mg).  Maintenance dose: 10-20 mg/kg (maximum: 2000 mg/dose and 4500 mg/day) starting at the next dosing interval determined by renal function  Pulse dose: fluctuating renal function, PRINCESS, ESRD  CRRT: 7.5-10 mg/kg q12h   Goal Vancomycin trough: 15-20 mcg/mL   Goal Vancomycin AUC: 400-600     Assessment/Plan:  Will initiate Vancomycin with a one time loading dose of 1000 mg x1, followed by 750 mg IV every 12 hours. Calculated Vancomycin AUC = 447 mg/L*h with an estimated steady-state vancomycin trough = 12.8 mcg/mL. Vancomycin level ordered for 2100. Timing of trough level will be determined based on culture results, renal function, and clinical response.     Gaurang Higgins PharmD 2/10/2024  3:50 AM    Vancomycin Day 2     No results for input(s): \"VANCOTROUGH\" in the last 72 hours.      Recent Labs     02/10/24  2055   VANCORANDOM 8.2           Recent Labs     02/09/24  2335 02/10/24  0645   CREATININE 0.9 0.8      Estimated Creatinine Clearance: 80 mL/min (based on SCr of 0.8 mg/dL).       Recent Labs     02/09/24  2335 02/10/24  0645   WBC 29.4* 29.2*      Plan: increase vancomycin to 1000mg q12h, recheck vanc random 2/11 at 2000  Predicted AUC: 504 mg/L.hr, predicted trough

## 2024-02-10 NOTE — ED NOTES
Pt non-compliant with wound care. States that he won't put foot in water unless it continues to be warm\.

## 2024-02-10 NOTE — CONSULTS
Consult Placed     Who: DIXIE COLBERT   Date:02/10/24  Time:743     Electronically signed by Shekhar Josue on 2/10/2024 at 7:42 AM

## 2024-02-11 ENCOUNTER — APPOINTMENT (OUTPATIENT)
Dept: MRI IMAGING | Age: 74
DRG: 854 | End: 2024-02-11
Payer: MEDICARE

## 2024-02-11 LAB
ANION GAP SERPL CALCULATED.3IONS-SCNC: 8 MMOL/L (ref 3–16)
BASOPHILS # BLD: 0.1 K/UL (ref 0–0.2)
BASOPHILS NFR BLD: 0.3 %
BUN SERPL-MCNC: 19 MG/DL (ref 7–20)
CALCIUM SERPL-MCNC: 8.1 MG/DL (ref 8.3–10.6)
CHLORIDE SERPL-SCNC: 104 MMOL/L (ref 99–110)
CO2 SERPL-SCNC: 24 MMOL/L (ref 21–32)
CREAT SERPL-MCNC: 0.6 MG/DL (ref 0.8–1.3)
CRP SERPL-MCNC: 189.6 MG/L (ref 0–5.1)
DEPRECATED RDW RBC AUTO: 13.9 % (ref 12.4–15.4)
EOSINOPHIL # BLD: 0 K/UL (ref 0–0.6)
EOSINOPHIL NFR BLD: 0.1 %
GFR SERPLBLD CREATININE-BSD FMLA CKD-EPI: >60 ML/MIN/{1.73_M2}
GLUCOSE BLD-MCNC: 163 MG/DL (ref 70–99)
GLUCOSE BLD-MCNC: 185 MG/DL (ref 70–99)
GLUCOSE BLD-MCNC: 197 MG/DL (ref 70–99)
GLUCOSE BLD-MCNC: 220 MG/DL (ref 70–99)
GLUCOSE SERPL-MCNC: 150 MG/DL (ref 70–99)
HCT VFR BLD AUTO: 23.8 % (ref 40.5–52.5)
HGB BLD-MCNC: 7.7 G/DL (ref 13.5–17.5)
LYMPHOCYTES # BLD: 1.2 K/UL (ref 1–5.1)
LYMPHOCYTES NFR BLD: 4.8 %
MCH RBC QN AUTO: 29.3 PG (ref 26–34)
MCHC RBC AUTO-ENTMCNC: 32.3 G/DL (ref 31–36)
MCV RBC AUTO: 90.9 FL (ref 80–100)
MONOCYTES # BLD: 1.8 K/UL (ref 0–1.3)
MONOCYTES NFR BLD: 7.2 %
NEUTROPHILS # BLD: 21.8 K/UL (ref 1.7–7.7)
NEUTROPHILS NFR BLD: 87.6 %
PERFORMED ON: ABNORMAL
PLATELET # BLD AUTO: 594 K/UL (ref 135–450)
PMV BLD AUTO: 6.9 FL (ref 5–10.5)
POTASSIUM SERPL-SCNC: 3.7 MMOL/L (ref 3.5–5.1)
RBC # BLD AUTO: 2.62 M/UL (ref 4.2–5.9)
SODIUM SERPL-SCNC: 136 MMOL/L (ref 136–145)
WBC # BLD AUTO: 25 K/UL (ref 4–11)

## 2024-02-11 PROCEDURE — 6370000000 HC RX 637 (ALT 250 FOR IP): Performed by: STUDENT IN AN ORGANIZED HEALTH CARE EDUCATION/TRAINING PROGRAM

## 2024-02-11 PROCEDURE — 2580000003 HC RX 258: Performed by: STUDENT IN AN ORGANIZED HEALTH CARE EDUCATION/TRAINING PROGRAM

## 2024-02-11 PROCEDURE — 6360000002 HC RX W HCPCS: Performed by: STUDENT IN AN ORGANIZED HEALTH CARE EDUCATION/TRAINING PROGRAM

## 2024-02-11 PROCEDURE — 85025 COMPLETE CBC W/AUTO DIFF WBC: CPT

## 2024-02-11 PROCEDURE — 80048 BASIC METABOLIC PNL TOTAL CA: CPT

## 2024-02-11 PROCEDURE — 94640 AIRWAY INHALATION TREATMENT: CPT

## 2024-02-11 PROCEDURE — 86900 BLOOD TYPING SEROLOGIC ABO: CPT

## 2024-02-11 PROCEDURE — 36415 COLL VENOUS BLD VENIPUNCTURE: CPT

## 2024-02-11 PROCEDURE — 86140 C-REACTIVE PROTEIN: CPT

## 2024-02-11 PROCEDURE — 1200000000 HC SEMI PRIVATE

## 2024-02-11 PROCEDURE — 6360000002 HC RX W HCPCS: Performed by: INTERNAL MEDICINE

## 2024-02-11 PROCEDURE — 86901 BLOOD TYPING SEROLOGIC RH(D): CPT

## 2024-02-11 RX ORDER — OXYCODONE AND ACETAMINOPHEN 7.5; 325 MG/1; MG/1
1 TABLET ORAL EVERY 4 HOURS PRN
Status: DISCONTINUED | OUTPATIENT
Start: 2024-02-11 | End: 2024-02-22

## 2024-02-11 RX ORDER — MORPHINE SULFATE 4 MG/ML
4 INJECTION, SOLUTION INTRAMUSCULAR; INTRAVENOUS EVERY 4 HOURS PRN
Status: DISCONTINUED | OUTPATIENT
Start: 2024-02-11 | End: 2024-02-21

## 2024-02-11 RX ORDER — IPRATROPIUM BROMIDE AND ALBUTEROL SULFATE 2.5; .5 MG/3ML; MG/3ML
1 SOLUTION RESPIRATORY (INHALATION) EVERY 4 HOURS PRN
Status: DISCONTINUED | OUTPATIENT
Start: 2024-02-11 | End: 2024-02-23 | Stop reason: HOSPADM

## 2024-02-11 RX ADMIN — MORPHINE SULFATE 2 MG: 2 INJECTION, SOLUTION INTRAMUSCULAR; INTRAVENOUS at 08:23

## 2024-02-11 RX ADMIN — MORPHINE SULFATE 4 MG: 4 INJECTION, SOLUTION INTRAMUSCULAR; INTRAVENOUS at 13:35

## 2024-02-11 RX ADMIN — MORPHINE SULFATE 2 MG: 2 INJECTION, SOLUTION INTRAMUSCULAR; INTRAVENOUS at 03:07

## 2024-02-11 RX ADMIN — VANCOMYCIN HYDROCHLORIDE 1000 MG: 1 INJECTION, POWDER, LYOPHILIZED, FOR SOLUTION INTRAVENOUS at 22:50

## 2024-02-11 RX ADMIN — SODIUM CHLORIDE: 9 INJECTION, SOLUTION INTRAVENOUS at 05:55

## 2024-02-11 RX ADMIN — OXYCODONE AND ACETAMINOPHEN 1 TABLET: 7.5; 325 TABLET ORAL at 20:18

## 2024-02-11 RX ADMIN — MORPHINE SULFATE 4 MG: 4 INJECTION, SOLUTION INTRAMUSCULAR; INTRAVENOUS at 11:10

## 2024-02-11 RX ADMIN — CEFEPIME 2000 MG: 2 INJECTION, POWDER, FOR SOLUTION INTRAVENOUS at 02:56

## 2024-02-11 RX ADMIN — METRONIDAZOLE 500 MG: 500 INJECTION, SOLUTION INTRAVENOUS at 13:42

## 2024-02-11 RX ADMIN — SODIUM CHLORIDE: 9 INJECTION, SOLUTION INTRAVENOUS at 02:54

## 2024-02-11 RX ADMIN — SODIUM CHLORIDE: 9 INJECTION, SOLUTION INTRAVENOUS at 21:52

## 2024-02-11 RX ADMIN — MORPHINE SULFATE 2 MG: 2 INJECTION, SOLUTION INTRAMUSCULAR; INTRAVENOUS at 05:52

## 2024-02-11 RX ADMIN — Medication 10 ML: at 03:08

## 2024-02-11 RX ADMIN — VANCOMYCIN HYDROCHLORIDE 1000 MG: 1 INJECTION, POWDER, LYOPHILIZED, FOR SOLUTION INTRAVENOUS at 08:30

## 2024-02-11 RX ADMIN — SODIUM CHLORIDE, PRESERVATIVE FREE 10 ML: 5 INJECTION INTRAVENOUS at 22:51

## 2024-02-11 RX ADMIN — METRONIDAZOLE 500 MG: 500 INJECTION, SOLUTION INTRAVENOUS at 06:06

## 2024-02-11 RX ADMIN — Medication 10 ML: at 05:53

## 2024-02-11 RX ADMIN — MORPHINE SULFATE 4 MG: 4 INJECTION, SOLUTION INTRAMUSCULAR; INTRAVENOUS at 21:44

## 2024-02-11 RX ADMIN — INSULIN LISPRO 1 UNITS: 100 INJECTION, SOLUTION INTRAVENOUS; SUBCUTANEOUS at 13:35

## 2024-02-11 RX ADMIN — METRONIDAZOLE 500 MG: 500 INJECTION, SOLUTION INTRAVENOUS at 21:52

## 2024-02-11 RX ADMIN — CEFEPIME 2000 MG: 2 INJECTION, POWDER, FOR SOLUTION INTRAVENOUS at 18:08

## 2024-02-11 RX ADMIN — Medication 2 PUFF: at 19:50

## 2024-02-11 RX ADMIN — CEFEPIME 2000 MG: 2 INJECTION, POWDER, FOR SOLUTION INTRAVENOUS at 10:55

## 2024-02-11 RX ADMIN — OXYCODONE AND ACETAMINOPHEN 1 TABLET: 7.5; 325 TABLET ORAL at 14:45

## 2024-02-11 RX ADMIN — ENOXAPARIN SODIUM 40 MG: 100 INJECTION SUBCUTANEOUS at 08:23

## 2024-02-11 ASSESSMENT — PAIN DESCRIPTION - ONSET
ONSET: ON-GOING

## 2024-02-11 ASSESSMENT — PAIN - FUNCTIONAL ASSESSMENT
PAIN_FUNCTIONAL_ASSESSMENT: PREVENTS OR INTERFERES SOME ACTIVE ACTIVITIES AND ADLS
PAIN_FUNCTIONAL_ASSESSMENT: PREVENTS OR INTERFERES SOME ACTIVE ACTIVITIES AND ADLS
PAIN_FUNCTIONAL_ASSESSMENT: ACTIVITIES ARE NOT PREVENTED
PAIN_FUNCTIONAL_ASSESSMENT: ACTIVITIES ARE NOT PREVENTED

## 2024-02-11 ASSESSMENT — PAIN SCALES - GENERAL
PAINLEVEL_OUTOF10: 3
PAINLEVEL_OUTOF10: 7
PAINLEVEL_OUTOF10: 7
PAINLEVEL_OUTOF10: 8
PAINLEVEL_OUTOF10: 6
PAINLEVEL_OUTOF10: 8
PAINLEVEL_OUTOF10: 6
PAINLEVEL_OUTOF10: 8
PAINLEVEL_OUTOF10: 7
PAINLEVEL_OUTOF10: 6
PAINLEVEL_OUTOF10: 2

## 2024-02-11 ASSESSMENT — PAIN DESCRIPTION - DESCRIPTORS
DESCRIPTORS: ACHING;BURNING
DESCRIPTORS: ACHING
DESCRIPTORS: ACHING;THROBBING
DESCRIPTORS: ACHING;BURNING

## 2024-02-11 ASSESSMENT — PAIN DESCRIPTION - LOCATION
LOCATION: FOOT
LOCATION: FOOT
LOCATION: LEG;FOOT
LOCATION: FOOT;LEG

## 2024-02-11 ASSESSMENT — PAIN DESCRIPTION - FREQUENCY
FREQUENCY: CONTINUOUS

## 2024-02-11 ASSESSMENT — PAIN DESCRIPTION - ORIENTATION
ORIENTATION: RIGHT

## 2024-02-11 ASSESSMENT — PAIN DESCRIPTION - PAIN TYPE
TYPE: ACUTE PAIN

## 2024-02-11 NOTE — PLAN OF CARE
Problem: Discharge Planning  Goal: Discharge to home or other facility with appropriate resources  Outcome: Progressing     Problem: Pain  Goal: Verbalizes/displays adequate comfort level or baseline comfort level  Outcome: Progressing     Problem: Skin/Tissue Integrity  Goal: Absence of new skin breakdown  Description: 1.  Monitor for areas of redness and/or skin breakdown  2.  Assess vascular access sites hourly  3.  Every 4-6 hours minimum:  Change oxygen saturation probe site  4.  Every 4-6 hours:  If on nasal continuous positive airway pressure, respiratory therapy assess nares and determine need for appliance change or resting period.  Outcome: Progressing     Problem: Safety - Adult  Goal: Free from fall injury  Outcome: Progressing     Problem: Skin/Tissue Integrity - Adult  Goal: Skin integrity remains intact  Outcome: Progressing     Problem: Hematologic - Adult  Goal: Maintains hematologic stability  Outcome: Progressing     Problem: Metabolic/Fluid and Electrolytes - Adult  Goal: Glucose maintained within prescribed range  Outcome: Progressing     Problem: Infection - Adult  Goal: Absence of infection at discharge  Outcome: Progressing     Problem: Genitourinary - Adult  Goal: Absence of urinary retention  Outcome: Progressing

## 2024-02-11 NOTE — PROGRESS NOTES
Pt back from MRI, was unable to complete imaging due to reported pain and inability to keep foot still, when asked if he would be ok waiting in MRI to see if he could receive pain meds, he refused and requested to return to room. MRI should be able to take him again later this afternoon so long as no emergency cases are scheduled, asked attending for extra one time dose of pain medicine to premedicate for MRI

## 2024-02-11 NOTE — ED PROVIDER NOTES
I independently examined and evaluated Jackson Martínez.    In brief, patient is a 73-year-old male presents to the emergency department for evaluation of pain and drainage from the right foot.  Reports this has been ongoing for several weeks.  The right foot was photographed and attached to his media (Saturnino Perez note).  Infectious workup including blood cultures were obtained.  He was given vancomycin and Zosyn for empiric antibiotics.  Hospitalist consulted for admission for further evaluation and treatment.  Admit.    All diagnostic, treatment, and disposition decisions were made by myself in conjunction with the advanced practice provider/resident physician.     I personally saw the patient and performed a substantive portion of the visit including aspects of the medical decision making. I approved management plan and take responsibility for the patient management.     I personally saw the patient and independently provided 10 minutes of non-concurrent critical care out of the total shared critical care time provided.    Comment: Please note this report has been produced using speech recognition software and may contain errors related to that system including errors in grammar, punctuation, and spelling, as well as words and phrases that may be inappropriate. If there are any questions or concerns please feel free to contact the dictating provider for clarification.    For all further details of the patient's emergency department visit, please see the advanced practice provider's documentation.        Laura Ramirez MD  02/11/24 0001

## 2024-02-11 NOTE — PROGRESS NOTES
V2.0  [facility] [place]      Name:  Jackson Martínez /Age/Sex: 1950  (73 y.o. male)   MRN & CSN:  [id number] & [id number] Encounter Date/Time: 2024 1:49 PM EST    Location:  [address] [place] [address] PCP: No primary care provider on file.       Hospital Day: 3    Assessment and Plan:   Jackson Martínez is a 73 y.o. male  who presents with Sepsis (HCC)      Plan:    Sepsis secondary osteomyelitis  X-ray of foot consistent with osteomyelitis.  Podiatry infectious disease consulted  Follow-up MRI for surgical planning  I IV antibiotics: Vancomycin plus cefepime plus Flagyl  Follow-up culture results  Pain control: Oral Percocet plus morphine for breakthrough    COPD  Not acute exacerbation  Continue DuoNebs and controller inhalers  Not acute exacerbation    Type II DM  Sliding scale insulin  Point-of-care glucose checks  Hypoglycemia protocol    Diet ADULT DIET; Regular   DVT Prophylaxis [x] Lovenox, []  Heparin, [] SCDs, [] Ambulation,    [] Eliquis, [] Xarelto  [] Coumadin [] other   Code Status Full Code   Disposition From: home  Expected Disposition: TBD  Estimated Date of Discharge: 2-4 days  Patient requires continued admission due to osteomyelitis and likely needs surgical intervention   Surrogate Decision Maker/ POA      Personally reviewed Lab Studies and Imaging     Discussed management of the case with podiatry who recommended MRI to assess osteomyelitis    EKG interpreted personally and results no acute ST changes    Imaging that was interpreted personally includes x-ray and results concerning for osteomyelitis    Drugs that require monitoring for toxicity include vancomycin and the method of monitoring was blood level monitoring    Subjective:     Chief Complaint: Wound Infection (A gangrene toe/foot right.  Pt states he used toenail clippers on second toe 2 weeks ago now a \"bad infection\".  )       Patient does endorse pain today.  Was unable to tolerate MRI.  Will optimize pain regiment     Specimen: Foot, Right   Result Value Ref Range    Gram Stain Result       No WBC's seen  No Epithelial Cells seen  2+ Gram positive cocci     POCT Glucose    Collection Time: 02/10/24  7:56 PM   Result Value Ref Range    POC Glucose 242 (H) 70 - 99 mg/dl    Performed on ACCU-CHEK    Vancomycin Level, Random    Collection Time: 02/10/24  8:55 PM   Result Value Ref Range    Vancomycin Rm 8.2 ug/mL   POCT Glucose    Collection Time: 02/11/24  8:14 AM   Result Value Ref Range    POC Glucose 163 (H) 70 - 99 mg/dl    Performed on ACCU-CHEK    Basic Metabolic Panel w/ Reflex to MG    Collection Time: 02/11/24  8:40 AM   Result Value Ref Range    Sodium 136 136 - 145 mmol/L    Potassium reflex Magnesium 3.7 3.5 - 5.1 mmol/L    Chloride 104 99 - 110 mmol/L    CO2 24 21 - 32 mmol/L    Anion Gap 8 3 - 16    Glucose 150 (H) 70 - 99 mg/dL    BUN 19 7 - 20 mg/dL    Creatinine 0.6 (L) 0.8 - 1.3 mg/dL    Est, Glom Filt Rate >60 >60    Calcium 8.1 (L) 8.3 - 10.6 mg/dL   CBC with Auto Differential    Collection Time: 02/11/24  8:40 AM   Result Value Ref Range    WBC 25.0 (H) 4.0 - 11.0 K/uL    RBC 2.62 (L) 4.20 - 5.90 M/uL    Hemoglobin 7.7 (L) 13.5 - 17.5 g/dL    Hematocrit 23.8 (L) 40.5 - 52.5 %    MCV 90.9 80.0 - 100.0 fL    MCH 29.3 26.0 - 34.0 pg    MCHC 32.3 31.0 - 36.0 g/dL    RDW 13.9 12.4 - 15.4 %    Platelets 594 (H) 135 - 450 K/uL    MPV 6.9 5.0 - 10.5 fL    Neutrophils % 87.6 %    Lymphocytes % 4.8 %    Monocytes % 7.2 %    Eosinophils % 0.1 %    Basophils % 0.3 %    Neutrophils Absolute 21.8 (H) 1.7 - 7.7 K/uL    Lymphocytes Absolute 1.2 1.0 - 5.1 K/uL    Monocytes Absolute 1.8 (H) 0.0 - 1.3 K/uL    Eosinophils Absolute 0.0 0.0 - 0.6 K/uL    Basophils Absolute 0.1 0.0 - 0.2 K/uL   C-Reactive Protein    Collection Time: 02/11/24  8:40 AM   Result Value Ref Range    .6 (H) 0.0 - 5.1 mg/L   POCT Glucose    Collection Time: 02/11/24  1:01 PM   Result Value Ref Range    POC Glucose 220 (H) 70 - 99 mg/dl    Performed

## 2024-02-12 ENCOUNTER — ANESTHESIA EVENT (OUTPATIENT)
Dept: OPERATING ROOM | Age: 74
End: 2024-02-12
Payer: MEDICARE

## 2024-02-12 ENCOUNTER — APPOINTMENT (OUTPATIENT)
Dept: VASCULAR LAB | Age: 74
DRG: 854 | End: 2024-02-12
Payer: MEDICARE

## 2024-02-12 ENCOUNTER — ANESTHESIA (OUTPATIENT)
Dept: OPERATING ROOM | Age: 74
End: 2024-02-12
Payer: MEDICARE

## 2024-02-12 ENCOUNTER — APPOINTMENT (OUTPATIENT)
Dept: MRI IMAGING | Age: 74
DRG: 854 | End: 2024-02-12
Payer: MEDICARE

## 2024-02-12 LAB
ABO + RH BLD: NORMAL
ABO + RH BLD: NORMAL
ANION GAP SERPL CALCULATED.3IONS-SCNC: 7 MMOL/L (ref 3–16)
BASOPHILS # BLD: 0 K/UL (ref 0–0.2)
BASOPHILS NFR BLD: 0.2 %
BLD GP AB SCN SERPL QL: NORMAL
BUN SERPL-MCNC: 18 MG/DL (ref 7–20)
CALCIUM SERPL-MCNC: 8.2 MG/DL (ref 8.3–10.6)
CHLORIDE SERPL-SCNC: 106 MMOL/L (ref 99–110)
CO2 SERPL-SCNC: 24 MMOL/L (ref 21–32)
CREAT SERPL-MCNC: 0.6 MG/DL (ref 0.8–1.3)
CRP SERPL-MCNC: 139.5 MG/L (ref 0–5.1)
DEPRECATED RDW RBC AUTO: 13.9 % (ref 12.4–15.4)
EOSINOPHIL # BLD: 0 K/UL (ref 0–0.6)
EOSINOPHIL NFR BLD: 0.2 %
GFR SERPLBLD CREATININE-BSD FMLA CKD-EPI: >60 ML/MIN/{1.73_M2}
GLUCOSE BLD-MCNC: 138 MG/DL (ref 70–99)
GLUCOSE BLD-MCNC: 155 MG/DL (ref 70–99)
GLUCOSE BLD-MCNC: 156 MG/DL (ref 70–99)
GLUCOSE BLD-MCNC: 168 MG/DL (ref 70–99)
GLUCOSE BLD-MCNC: 175 MG/DL (ref 70–99)
GLUCOSE SERPL-MCNC: 128 MG/DL (ref 70–99)
HCT VFR BLD AUTO: 24.8 % (ref 40.5–52.5)
HGB BLD-MCNC: 8 G/DL (ref 13.5–17.5)
LYMPHOCYTES # BLD: 1.2 K/UL (ref 1–5.1)
LYMPHOCYTES NFR BLD: 5.8 %
MCH RBC QN AUTO: 29.3 PG (ref 26–34)
MCHC RBC AUTO-ENTMCNC: 32.5 G/DL (ref 31–36)
MCV RBC AUTO: 90.2 FL (ref 80–100)
MONOCYTES # BLD: 1.4 K/UL (ref 0–1.3)
MONOCYTES NFR BLD: 6.8 %
NEUTROPHILS # BLD: 18.3 K/UL (ref 1.7–7.7)
NEUTROPHILS NFR BLD: 87 %
PERFORMED ON: ABNORMAL
PLATELET # BLD AUTO: 663 K/UL (ref 135–450)
PMV BLD AUTO: 7 FL (ref 5–10.5)
POTASSIUM SERPL-SCNC: 3.9 MMOL/L (ref 3.5–5.1)
RBC # BLD AUTO: 2.75 M/UL (ref 4.2–5.9)
SODIUM SERPL-SCNC: 137 MMOL/L (ref 136–145)
VANCOMYCIN SERPL-MCNC: 12.7 UG/ML
WBC # BLD AUTO: 21 K/UL (ref 4–11)

## 2024-02-12 PROCEDURE — 7100000001 HC PACU RECOVERY - ADDTL 15 MIN: Performed by: PODIATRIST

## 2024-02-12 PROCEDURE — 87186 SC STD MICRODIL/AGAR DIL: CPT

## 2024-02-12 PROCEDURE — 93926 LOWER EXTREMITY STUDY: CPT

## 2024-02-12 PROCEDURE — 87102 FUNGUS ISOLATION CULTURE: CPT

## 2024-02-12 PROCEDURE — 3600000013 HC SURGERY LEVEL 3 ADDTL 15MIN: Performed by: PODIATRIST

## 2024-02-12 PROCEDURE — 6360000002 HC RX W HCPCS: Performed by: INTERNAL MEDICINE

## 2024-02-12 PROCEDURE — 86140 C-REACTIVE PROTEIN: CPT

## 2024-02-12 PROCEDURE — 6370000000 HC RX 637 (ALT 250 FOR IP): Performed by: STUDENT IN AN ORGANIZED HEALTH CARE EDUCATION/TRAINING PROGRAM

## 2024-02-12 PROCEDURE — 6360000002 HC RX W HCPCS: Performed by: PODIATRIST

## 2024-02-12 PROCEDURE — 86850 RBC ANTIBODY SCREEN: CPT

## 2024-02-12 PROCEDURE — 3700000000 HC ANESTHESIA ATTENDED CARE: Performed by: PODIATRIST

## 2024-02-12 PROCEDURE — 87077 CULTURE AEROBIC IDENTIFY: CPT

## 2024-02-12 PROCEDURE — 87075 CULTR BACTERIA EXCEPT BLOOD: CPT

## 2024-02-12 PROCEDURE — 6360000002 HC RX W HCPCS: Performed by: STUDENT IN AN ORGANIZED HEALTH CARE EDUCATION/TRAINING PROGRAM

## 2024-02-12 PROCEDURE — 3700000001 HC ADD 15 MINUTES (ANESTHESIA): Performed by: PODIATRIST

## 2024-02-12 PROCEDURE — 6360000002 HC RX W HCPCS: Performed by: ANESTHESIOLOGY

## 2024-02-12 PROCEDURE — 87206 SMEAR FLUORESCENT/ACID STAI: CPT

## 2024-02-12 PROCEDURE — 87070 CULTURE OTHR SPECIMN AEROBIC: CPT

## 2024-02-12 PROCEDURE — 86901 BLOOD TYPING SEROLOGIC RH(D): CPT

## 2024-02-12 PROCEDURE — 87015 SPECIMEN INFECT AGNT CONCNTJ: CPT

## 2024-02-12 PROCEDURE — 85025 COMPLETE CBC W/AUTO DIFF WBC: CPT

## 2024-02-12 PROCEDURE — 2580000003 HC RX 258: Performed by: STUDENT IN AN ORGANIZED HEALTH CARE EDUCATION/TRAINING PROGRAM

## 2024-02-12 PROCEDURE — 36415 COLL VENOUS BLD VENIPUNCTURE: CPT

## 2024-02-12 PROCEDURE — 99231 SBSQ HOSP IP/OBS SF/LOW 25: CPT | Performed by: INTERNAL MEDICINE

## 2024-02-12 PROCEDURE — 3600000003 HC SURGERY LEVEL 3 BASE: Performed by: PODIATRIST

## 2024-02-12 PROCEDURE — 2500000003 HC RX 250 WO HCPCS: Performed by: ANESTHESIOLOGY

## 2024-02-12 PROCEDURE — 86900 BLOOD TYPING SEROLOGIC ABO: CPT

## 2024-02-12 PROCEDURE — 80202 ASSAY OF VANCOMYCIN: CPT

## 2024-02-12 PROCEDURE — 7100000000 HC PACU RECOVERY - FIRST 15 MIN: Performed by: PODIATRIST

## 2024-02-12 PROCEDURE — 73718 MRI LOWER EXTREMITY W/O DYE: CPT

## 2024-02-12 PROCEDURE — 0Y6T0Z0 DETACHMENT AT RIGHT 3RD TOE, COMPLETE, OPEN APPROACH: ICD-10-PCS | Performed by: PODIATRIST

## 2024-02-12 PROCEDURE — 1200000000 HC SEMI PRIVATE

## 2024-02-12 PROCEDURE — 2580000003 HC RX 258: Performed by: PODIATRIST

## 2024-02-12 PROCEDURE — 87205 SMEAR GRAM STAIN: CPT

## 2024-02-12 PROCEDURE — 87116 MYCOBACTERIA CULTURE: CPT

## 2024-02-12 PROCEDURE — 2709999900 HC NON-CHARGEABLE SUPPLY: Performed by: PODIATRIST

## 2024-02-12 PROCEDURE — 86403 PARTICLE AGGLUT ANTBDY SCRN: CPT

## 2024-02-12 PROCEDURE — 80048 BASIC METABOLIC PNL TOTAL CA: CPT

## 2024-02-12 PROCEDURE — 0JBQ0ZZ EXCISION OF RIGHT FOOT SUBCUTANEOUS TISSUE AND FASCIA, OPEN APPROACH: ICD-10-PCS | Performed by: PODIATRIST

## 2024-02-12 RX ORDER — ROCURONIUM BROMIDE 10 MG/ML
INJECTION, SOLUTION INTRAVENOUS PRN
Status: DISCONTINUED | OUTPATIENT
Start: 2024-02-12 | End: 2024-02-12 | Stop reason: SDUPTHER

## 2024-02-12 RX ORDER — SODIUM CHLORIDE 9 MG/ML
INJECTION, SOLUTION INTRAVENOUS PRN
Status: DISCONTINUED | OUTPATIENT
Start: 2024-02-12 | End: 2024-02-12

## 2024-02-12 RX ORDER — ONDANSETRON 2 MG/ML
4 INJECTION INTRAMUSCULAR; INTRAVENOUS EVERY 30 MIN PRN
Status: DISCONTINUED | OUTPATIENT
Start: 2024-02-12 | End: 2024-02-12

## 2024-02-12 RX ORDER — FENTANYL CITRATE 50 UG/ML
INJECTION, SOLUTION INTRAMUSCULAR; INTRAVENOUS PRN
Status: DISCONTINUED | OUTPATIENT
Start: 2024-02-12 | End: 2024-02-12 | Stop reason: SDUPTHER

## 2024-02-12 RX ORDER — SODIUM CHLORIDE 0.9 % (FLUSH) 0.9 %
5-40 SYRINGE (ML) INJECTION EVERY 12 HOURS SCHEDULED
Status: DISCONTINUED | OUTPATIENT
Start: 2024-02-12 | End: 2024-02-12

## 2024-02-12 RX ORDER — OXYCODONE HYDROCHLORIDE 5 MG/1
10 TABLET ORAL PRN
Status: DISCONTINUED | OUTPATIENT
Start: 2024-02-12 | End: 2024-02-12

## 2024-02-12 RX ORDER — OXYCODONE HYDROCHLORIDE 5 MG/1
5 TABLET ORAL PRN
Status: DISCONTINUED | OUTPATIENT
Start: 2024-02-12 | End: 2024-02-12

## 2024-02-12 RX ORDER — PROPOFOL 10 MG/ML
INJECTION, EMULSION INTRAVENOUS PRN
Status: DISCONTINUED | OUTPATIENT
Start: 2024-02-12 | End: 2024-02-12 | Stop reason: SDUPTHER

## 2024-02-12 RX ORDER — SODIUM CHLORIDE 0.9 % (FLUSH) 0.9 %
5-40 SYRINGE (ML) INJECTION PRN
Status: DISCONTINUED | OUTPATIENT
Start: 2024-02-12 | End: 2024-02-12

## 2024-02-12 RX ORDER — DEXMEDETOMIDINE HYDROCHLORIDE 100 UG/ML
INJECTION, SOLUTION INTRAVENOUS PRN
Status: DISCONTINUED | OUTPATIENT
Start: 2024-02-12 | End: 2024-02-12 | Stop reason: SDUPTHER

## 2024-02-12 RX ORDER — PHENYLEPHRINE HCL IN 0.9% NACL 1 MG/10 ML
SYRINGE (ML) INTRAVENOUS PRN
Status: DISCONTINUED | OUTPATIENT
Start: 2024-02-12 | End: 2024-02-12 | Stop reason: SDUPTHER

## 2024-02-12 RX ORDER — SUCCINYLCHOLINE CHLORIDE 20 MG/ML
INJECTION INTRAMUSCULAR; INTRAVENOUS PRN
Status: DISCONTINUED | OUTPATIENT
Start: 2024-02-12 | End: 2024-02-12 | Stop reason: SDUPTHER

## 2024-02-12 RX ADMIN — MORPHINE SULFATE 4 MG: 4 INJECTION, SOLUTION INTRAMUSCULAR; INTRAVENOUS at 02:51

## 2024-02-12 RX ADMIN — MORPHINE SULFATE 4 MG: 4 INJECTION, SOLUTION INTRAMUSCULAR; INTRAVENOUS at 15:18

## 2024-02-12 RX ADMIN — SODIUM CHLORIDE 25 ML: 9 INJECTION, SOLUTION INTRAVENOUS at 11:46

## 2024-02-12 RX ADMIN — MORPHINE SULFATE 4 MG: 4 INJECTION, SOLUTION INTRAMUSCULAR; INTRAVENOUS at 10:07

## 2024-02-12 RX ADMIN — SODIUM CHLORIDE 25 ML: 9 INJECTION, SOLUTION INTRAVENOUS at 18:07

## 2024-02-12 RX ADMIN — CEFEPIME 2000 MG: 2 INJECTION, POWDER, FOR SOLUTION INTRAVENOUS at 19:54

## 2024-02-12 RX ADMIN — SODIUM CHLORIDE 25 ML: 9 INJECTION, SOLUTION INTRAVENOUS at 08:53

## 2024-02-12 RX ADMIN — CEFEPIME 2000 MG: 2 INJECTION, POWDER, FOR SOLUTION INTRAVENOUS at 11:47

## 2024-02-12 RX ADMIN — SUCCINYLCHOLINE CHLORIDE 120 MG: 20 INJECTION, SOLUTION INTRAMUSCULAR; INTRAVENOUS at 22:25

## 2024-02-12 RX ADMIN — HYDROMORPHONE HYDROCHLORIDE 0.5 MG: 1 INJECTION, SOLUTION INTRAMUSCULAR; INTRAVENOUS; SUBCUTANEOUS at 23:31

## 2024-02-12 RX ADMIN — VANCOMYCIN HYDROCHLORIDE 1000 MG: 1 INJECTION, POWDER, LYOPHILIZED, FOR SOLUTION INTRAVENOUS at 18:09

## 2024-02-12 RX ADMIN — ROCURONIUM BROMIDE 5 MG: 50 INJECTION, SOLUTION INTRAVENOUS at 22:24

## 2024-02-12 RX ADMIN — Medication 100 MCG: at 22:26

## 2024-02-12 RX ADMIN — CEFEPIME 2000 MG: 2 INJECTION, POWDER, FOR SOLUTION INTRAVENOUS at 02:35

## 2024-02-12 RX ADMIN — OXYCODONE AND ACETAMINOPHEN 1 TABLET: 7.5; 325 TABLET ORAL at 00:22

## 2024-02-12 RX ADMIN — DEXMEDETOMIDINE HCL 2 MCG: 100 INJECTION INTRAVENOUS at 22:45

## 2024-02-12 RX ADMIN — OXYCODONE AND ACETAMINOPHEN 1 TABLET: 7.5; 325 TABLET ORAL at 18:31

## 2024-02-12 RX ADMIN — ONDANSETRON 4 MG: 2 INJECTION INTRAMUSCULAR; INTRAVENOUS at 22:51

## 2024-02-12 RX ADMIN — OXYCODONE AND ACETAMINOPHEN 1 TABLET: 7.5; 325 TABLET ORAL at 06:25

## 2024-02-12 RX ADMIN — OXYCODONE AND ACETAMINOPHEN 1 TABLET: 7.5; 325 TABLET ORAL at 13:22

## 2024-02-12 RX ADMIN — PROPOFOL 150 MG: 10 INJECTION, EMULSION INTRAVENOUS at 22:24

## 2024-02-12 RX ADMIN — METRONIDAZOLE 500 MG: 500 INJECTION, SOLUTION INTRAVENOUS at 18:10

## 2024-02-12 RX ADMIN — FENTANYL CITRATE 50 MCG: 50 INJECTION, SOLUTION INTRAMUSCULAR; INTRAVENOUS at 22:33

## 2024-02-12 RX ADMIN — HYDROMORPHONE HYDROCHLORIDE 0.5 MG: 1 INJECTION, SOLUTION INTRAMUSCULAR; INTRAVENOUS; SUBCUTANEOUS at 23:22

## 2024-02-12 RX ADMIN — FLUTICASONE PROPIONATE 1 SPRAY: 50 SPRAY, METERED NASAL at 08:43

## 2024-02-12 RX ADMIN — FENTANYL CITRATE 25 MCG: 50 INJECTION, SOLUTION INTRAMUSCULAR; INTRAVENOUS at 22:39

## 2024-02-12 RX ADMIN — HYDROMORPHONE HYDROCHLORIDE 0.5 MG: 1 INJECTION, SOLUTION INTRAMUSCULAR; INTRAVENOUS; SUBCUTANEOUS at 23:15

## 2024-02-12 RX ADMIN — SODIUM CHLORIDE: 9 INJECTION, SOLUTION INTRAVENOUS at 19:47

## 2024-02-12 RX ADMIN — METRONIDAZOLE 500 MG: 500 INJECTION, SOLUTION INTRAVENOUS at 05:46

## 2024-02-12 RX ADMIN — SODIUM CHLORIDE, PRESERVATIVE FREE 10 ML: 5 INJECTION INTRAVENOUS at 19:48

## 2024-02-12 RX ADMIN — FENTANYL CITRATE 25 MCG: 50 INJECTION, SOLUTION INTRAMUSCULAR; INTRAVENOUS at 22:42

## 2024-02-12 RX ADMIN — ENOXAPARIN SODIUM 40 MG: 100 INJECTION SUBCUTANEOUS at 08:49

## 2024-02-12 ASSESSMENT — PAIN - FUNCTIONAL ASSESSMENT

## 2024-02-12 ASSESSMENT — PAIN DESCRIPTION - ORIENTATION
ORIENTATION: RIGHT

## 2024-02-12 ASSESSMENT — PAIN DESCRIPTION - PAIN TYPE
TYPE: ACUTE PAIN
TYPE: ACUTE PAIN
TYPE: SURGICAL PAIN
TYPE: ACUTE PAIN

## 2024-02-12 ASSESSMENT — PAIN DESCRIPTION - FREQUENCY
FREQUENCY: CONTINUOUS

## 2024-02-12 ASSESSMENT — PAIN DESCRIPTION - DESCRIPTORS
DESCRIPTORS: ACHING

## 2024-02-12 ASSESSMENT — PAIN DESCRIPTION - LOCATION
LOCATION: FOOT
LOCATION: FOOT
LOCATION: FOOT;TOE (COMMENT WHICH ONE)
LOCATION: FOOT

## 2024-02-12 ASSESSMENT — PAIN DESCRIPTION - ONSET
ONSET: ON-GOING

## 2024-02-12 ASSESSMENT — PAIN SCALES - GENERAL
PAINLEVEL_OUTOF10: 7
PAINLEVEL_OUTOF10: 7
PAINLEVEL_OUTOF10: 4
PAINLEVEL_OUTOF10: 6
PAINLEVEL_OUTOF10: 4
PAINLEVEL_OUTOF10: 8
PAINLEVEL_OUTOF10: 7
PAINLEVEL_OUTOF10: 7
PAINLEVEL_OUTOF10: 4
PAINLEVEL_OUTOF10: 4
PAINLEVEL_OUTOF10: 8
PAINLEVEL_OUTOF10: 7
PAINLEVEL_OUTOF10: 8
PAINLEVEL_OUTOF10: 5
PAINLEVEL_OUTOF10: 8
PAINLEVEL_OUTOF10: 8
PAINLEVEL_OUTOF10: 4

## 2024-02-12 NOTE — PROGRESS NOTES
Infectious Disease Follow up Notes    CC :  infection R foot      Antibiotics:  Cefepime 2g q8  Flagyl 500 IV q8  Vanc 1g q12      Admit Date:   2/9/2024  Hospital Day: 4    Subjective:   He remains AF on RA   Going for MRI and arterial studies   Case d/w RN     Objective:     Patient Vitals for the past 8 hrs:   BP Temp Temp src Pulse Resp SpO2   02/12/24 0840 116/62 98.4 °F (36.9 °C) Oral 65 18 97 %   02/12/24 0655 -- -- -- -- 18 --   02/12/24 0329 124/70 98.3 °F (36.8 °C) Oral 89 18 97 %   02/12/24 0321 -- -- -- -- 18 --   02/12/24 0251 -- -- -- -- 18 --       EXAM:  Alert, conversant, NAD   Exposed skin without rash  R foot dressed        LINE:   PIV in place       2/12/24            2/10/24                Scheduled Meds:   vancomycin  1,000 mg IntraVENous Q12H    sodium chloride flush  5-40 mL IntraVENous 2 times per day    nicotine  1 patch TransDERmal Daily    enoxaparin  40 mg SubCUTAneous Daily    fluticasone  1 spray Nasal Daily    mometasone-formoterol  2 puff Inhalation BID RT    insulin lispro  0-4 Units SubCUTAneous TID WC    insulin lispro  0-4 Units SubCUTAneous Nightly    cefepime  2,000 mg IntraVENous Q8H    metroNIDAZOLE  500 mg IntraVENous 3 times per day       Continuous Infusions:   sodium chloride 25 mL (02/12/24 0853)    dextrose            Data Review:    Lab Results   Component Value Date    WBC 25.0 (H) 02/11/2024    HGB 7.7 (L) 02/11/2024    HCT 23.8 (L) 02/11/2024    MCV 90.9 02/11/2024     (H) 02/11/2024     Lab Results   Component Value Date    CREATININE 0.6 (L) 02/11/2024    BUN 19 02/11/2024     02/11/2024    K 3.7 02/11/2024     02/11/2024    CO2 24 02/11/2024       Hepatic Function Panel:   Lab Results   Component Value Date/Time    ALKPHOS 163 02/10/2024 06:45 AM    ALT 20 02/10/2024 06:45 AM    AST 26 02/10/2024 06:45 AM    PROT 6.9 02/10/2024 06:45 AM    PROT 8.2 10/02/2011 12:05  AM    BILITOT 0.8 02/10/2024 06:45 AM    LABALBU 2.3 02/10/2024 06:45 AM       CRP   Date Value Ref Range Status   2024 189.6 (H) 0.0 - 5.1 mg/L Final     Comment:     WR-CRP Reference range:  30D-199Y    <5.1  <30D        Not established    CRP is used in the detection and evaluation of infection,  tissue injury, inflammatory disorders, and associated  disease.  Increases in CRP values are non-specific and  should not be interpreted without a complete clinical  evaluation.   reference ranges have not been  established and values should be interpreted within clinical  context and with serial measurements, if clinically  appropriate.     2024 288.8 (H) 0.0 - 5.1 mg/L Final     Comment:     WR-CRP Reference range:  30D-199Y    <5.1  <30D        Not established    CRP is used in the detection and evaluation of infection,  tissue injury, inflammatory disorders, and associated  disease.  Increases in CRP values are non-specific and  should not be interpreted without a complete clinical  evaluation.   reference ranges have not been  established and values should be interpreted within clinical  context and with serial measurements, if clinically  appropriate.           MICRO:   BC x2 NGTD  2/10 Wound culture light growth S aures, ANIL pending: GS GPC       IMAGING:  XR R foot - suspicious for OM 3rd toe       Assessment:     Patient Active Problem List    Diagnosis Date Noted    Sepsis ([facility]) 02/10/2024    Cellulitis of right foot 02/10/2024    Osteomyelitis of third toe of right foot (HCC) 02/10/2024    Type 2 diabetes mellitus with right diabetic foot infection (HCC) 02/10/2024    Neutrophilia 02/10/2024    Cellulitis and abscess of foot 02/10/2024    Smoking 02/10/2024    PVD (peripheral vascular disease) ([facility]) 02/10/2024    Diabetes mellitus, new onset ([facility]) 02/10/2024    Severe sepsis ([facility]) 02/10/2024       S/p splenectomy   History HTN, smoking     Admitted 24 with infection of R foot

## 2024-02-12 NOTE — PROGRESS NOTES
Hospital Medicine Progress Note      Date of Admission: 2/9/2024  Hospital Day: 5    Chief Admission Complaint:  rt foot pain     Subjective:  resting in bed, notes some pain at rt foot    Presenting Admission History:         Jackson Martínez is a 73 y.o. male with pmh of tobacco abuse who presents with ongoing right foot pain with wound and drainage, found to be in severe sepsis.      In the ER, patient was hypertensive on arrival, tachycardic 110s. XR right foot on arrival showing bony resorption of 3rd toe suspicious for osteomyelitis with soft tissue swelling and neg soft tissue gas. Labwork significant for Lactate 2.8, .8, , leukocytosis of 29.4, Hgb of 9.2. Received sepsis bolus, IV Vanc and Zosyn x 1. Patient admitted to the floor for continuous monitoring, IV meds and fluids, specialty evaluation.      Patient states his symptoms started 2 weeks ago when he cut his right third toenail with a clipper and developed a wound. Has had progressively worsening pain, drainage, bleeding, and difficulty bearing weight on his right foot due to the pain.  Did not seek care at an urgent care.  Has only taken Tylenol or Advil at home as needed for pain.  He denies prior history of similar wounds, diabetes, neuropathy, history of hypertension or hyperlipidemia. Mentions he had a \"pain pill problem\" years ago.  He denies recent fever, chills, chest pain, SOB, abdominal pain, nausea or vomiting, diarrhea constipation, hematuria or dysuria, bloody stools, headaches, dizziness, acute vision or hearing changes, LOC, numbness or tingling.       Assessment/Plan:      Current Principal Problem:  Sepsis (HCC)      Sepsis secondary osteomyelitis  X-ray of foot was consistent with osteomyelitis.  Podiatry and ID were consulted, apprec recs and mgmt  Follow-up MRI for surgical planning  I IV antibiotics: Vancomycin plus cefepime plus Flagyl  Follow-up culture results  Pain control: Oral Percocet plus morphine for

## 2024-02-12 NOTE — CARE COORDINATION
Case Management Assessment  Initial Evaluation    Date/Time of Evaluation: 2/12/2024 9:58 AM  Assessment Completed by: Katharine Ba RN    If patient is discharged prior to next notation, then this note serves as note for discharge by case management.    Patient Name: Jackson Martínez                   YOB: 1950  Diagnosis: Cellulitis of right foot [L03.115]  Sepsis (HCC) [A41.9]  Severe sepsis (HCC) [A41.9, R65.20]  Osteomyelitis of third toe of right foot (HCC) [M86.9]                   Date / Time: 2/9/2024 11:07 PM    Patient Admission Status: Inpatient   Readmission Risk (Low < 19, Mod (19-27), High > 27): Readmission Risk Score: 13.9    Current PCP: No primary care provider on file.  PCP verified by CM? No (pt does not have a PCP, list provided)    Chart Reviewed: Yes      History Provided by: Patient  Patient Orientation: Alert and Oriented, Person, Place, Situation    Patient Cognition: Alert    Hospitalization in the last 30 days (Readmission):  No    If yes, Readmission Assessment in CM Navigator will be completed.    Advance Directives:      Code Status: Full Code   Patient's Primary Decision Maker is: Legal Next of Kin      Discharge Planning:    Patient lives with: Spouse/Significant Other Type of Home: Apartment  Primary Care Giver: Self  Patient Support Systems include: Spouse/Significant Other   Current Financial resources:    Current community resources:    Current services prior to admission: None            Current DME:              Type of Home Care services:  None    ADLS  Prior functional level:    Current functional level:      PT AM-PAC:   /24  OT AM-PAC:   /24    Family can provide assistance at DC:    Would you like Case Management to discuss the discharge plan with any other family members/significant others, and if so, who?    Plans to Return to Present Housing:    Other Identified Issues/Barriers to RETURNING to current housing:   Potential Assistance needed at

## 2024-02-12 NOTE — CONSULTS
[facility] Ostomy Continence Nurse  Consult Note       NAME:  Jackson Martínez  MEDICAL RECORD NUMBER:  [id number]  AGE: 73 y.o.   GENDER: male  : 1950  TODAY'S DATE:  2024    Subjective; Be careful my foot is so sore.   Reason for WOCN Evaluation and Assessment:     Right foot wound      Dr. Slater Podiatry, saw patient 02/10/24   Osteomyelitis right third toe with sepsis :  The resorption of the bone of the third toe is indicative of osteomyelitis and has likely been present for longer than patient relates in his history.  I will send him for an MRI to determine if there is a deep space abscess more proximally in the foot.  Discussed with patient amputation of the third to and possible extensive Incision and Drainage.  Patient states he understands.    Peripheral Arterial disease?  Given patient's nonpalpable pulses, smoking history and impending surgical intervention, I will send him for vascular studies and refer to vascular surgery if necessary.    Peripheral Neuropathy          Jackson Martínez is a 73 y.o. male referred by:   [] Physician  [x] Nursing  [] Other:     Wound Identification:  Wound Type: arterial and diabetic  Contributing Factors: diabetes, poor glucose control, chronic pressure, decreased mobility, and shear force    Wound History: 2 weeks of worsening pain, wound, drainage of right foot. Possibly d/t unhealing injury with underlying diabetes, also has prior Hx of opioid abuse.   Current Wound Care Treatment:  alginate ag    Patient Goal of Care:  [x] Wound Healing  [] Odor Control  [x] Palliative Care  [] Pain Control   [] Other:         PAST MEDICAL HISTORY        Diagnosis Date    Bronchitis        PAST SURGICAL HISTORY    Past Surgical History:   Procedure Laterality Date    ADENOIDECTOMY      NASAL SEPTUM SURGERY      SPLENECTOMY      TONSILLECTOMY         FAMILY HISTORY    No family history on file.    SOCIAL HISTORY    Social History     Tobacco Use    Smoking status: Every

## 2024-02-12 NOTE — PLAN OF CARE
Problem: Discharge Planning  Goal: Discharge to home or other facility with appropriate resources  Outcome: Progressing     Problem: Pain  Goal: Verbalizes/displays adequate comfort level or baseline comfort level  Outcome: Progressing     Problem: Skin/Tissue Integrity  Goal: Absence of new skin breakdown  Description: 1.  Monitor for areas of redness and/or skin breakdown  2.  Assess vascular access sites hourly  3.  Every 4-6 hours minimum:  Change oxygen saturation probe site  4.  Every 4-6 hours:  If on nasal continuous positive airway pressure, respiratory therapy assess nares and determine need for appliance change or resting period.  Outcome: Progressing     Problem: Safety - Adult  Goal: Free from fall injury  Outcome: Progressing     Problem: ABCDS Injury Assessment  Goal: Absence of physical injury  Outcome: Progressing     Problem: Skin/Tissue Integrity - Adult  Goal: Skin integrity remains intact  Outcome: Progressing  Goal: Incisions, wounds, or drain sites healing without S/S of infection  Outcome: Progressing     Problem: Musculoskeletal - Adult  Goal: Return mobility to safest level of function  Outcome: Progressing     Problem: Genitourinary - Adult  Goal: Absence of urinary retention  Outcome: Progressing  Flowsheets (Taken 2/12/2024 9511)  Absence of urinary retention: Assess patient’s ability to void and empty bladder     Problem: Infection - Adult  Goal: Absence of infection at discharge  Outcome: Progressing  Goal: Absence of infection during hospitalization  Outcome: Progressing     Problem: Metabolic/Fluid and Electrolytes - Adult  Goal: Glucose maintained within prescribed range  Outcome: Progressing     Problem: Hematologic - Adult  Goal: Maintains hematologic stability  Outcome: Progressing     Problem: Chronic Conditions and Co-morbidities  Goal: Patient's chronic conditions and co-morbidity symptoms are monitored and maintained or improved  Outcome: Progressing

## 2024-02-12 NOTE — ACP (ADVANCE CARE PLANNING)
Advance Care Planning     General Advance Care Planning (ACP) Conversation    Date of Conversation: 2/9/2024  Conducted with: Patient with Decision Making Capacity   Healthcare Decision Maker: Next of Kin by law (only applies in absence of above) (name) spouse Savi Martínez [phone]    Healthcare Decision Maker:  No healthcare decision makers have been documented.  Click here to complete HealthCare Decision Makers including selection of the Healthcare Decision Maker Relationship (ie \"Primary\")  Today we documented Decision Maker(s) consistent with Legal Next of Kin hierarchy.    Content/Action Overview:  Has NO ACP documents-Information provided  Reviewed DNR/DNI and patient elects Full Code (Attempt Resuscitation)      Length of Voluntary ACP Conversation in minutes:  <16 minutes (Non-Billable)    Katharine Ba RN

## 2024-02-12 NOTE — PLAN OF CARE
Problem: Discharge Planning  Goal: Discharge to home or other facility with appropriate resources  Outcome: Progressing     Problem: Pain  Goal: Verbalizes/displays adequate comfort level or baseline comfort level  Outcome: Progressing     Problem: Chronic Conditions and Co-morbidities  Goal: Patient's chronic conditions and co-morbidity symptoms are monitored and maintained or improved  Outcome: Progressing     Problem: Metabolic/Fluid and Electrolytes - Adult  Goal: Glucose maintained within prescribed range  Outcome: Progressing     Problem: Safety - Adult  Goal: Free from fall injury  Outcome: Progressing     Problem: Skin/Tissue Integrity  Goal: Absence of new skin breakdown  Description: 1.  Monitor for areas of redness and/or skin breakdown  2.  Assess vascular access sites hourly  3.  Every 4-6 hours minimum:  Change oxygen saturation probe site  4.  Every 4-6 hours:  If on nasal continuous positive airway pressure, respiratory therapy assess nares and determine need for appliance change or resting period.  Outcome: Progressing

## 2024-02-13 ENCOUNTER — ANESTHESIA (OUTPATIENT)
Dept: OPERATING ROOM | Age: 74
End: 2024-02-13
Payer: MEDICARE

## 2024-02-13 ENCOUNTER — ANESTHESIA EVENT (OUTPATIENT)
Dept: OPERATING ROOM | Age: 74
End: 2024-02-13
Payer: MEDICARE

## 2024-02-13 PROBLEM — M86.9 PYOGENIC INFLAMMATION OF BONE (HCC): Status: ACTIVE | Noted: 2024-02-13

## 2024-02-13 LAB
ACID FAST STN SPEC QL: NORMAL
ANION GAP SERPL CALCULATED.3IONS-SCNC: 7 MMOL/L (ref 3–16)
BASOPHILS # BLD: 0 K/UL (ref 0–0.2)
BASOPHILS NFR BLD: 0.1 %
BUN SERPL-MCNC: 14 MG/DL (ref 7–20)
CALCIUM SERPL-MCNC: 7.8 MG/DL (ref 8.3–10.6)
CHLORIDE SERPL-SCNC: 105 MMOL/L (ref 99–110)
CO2 SERPL-SCNC: 24 MMOL/L (ref 21–32)
CREAT SERPL-MCNC: 0.6 MG/DL (ref 0.8–1.3)
CRP SERPL-MCNC: 98 MG/L (ref 0–5.1)
DEPRECATED RDW RBC AUTO: 14.1 % (ref 12.4–15.4)
EOSINOPHIL # BLD: 0 K/UL (ref 0–0.6)
EOSINOPHIL NFR BLD: 0.2 %
GFR SERPLBLD CREATININE-BSD FMLA CKD-EPI: >60 ML/MIN/{1.73_M2}
GLUCOSE BLD-MCNC: 123 MG/DL (ref 70–99)
GLUCOSE BLD-MCNC: 131 MG/DL (ref 70–99)
GLUCOSE BLD-MCNC: 191 MG/DL (ref 70–99)
GLUCOSE BLD-MCNC: 238 MG/DL (ref 70–99)
GLUCOSE SERPL-MCNC: 108 MG/DL (ref 70–99)
HCT VFR BLD AUTO: 22.2 % (ref 40.5–52.5)
HGB BLD-MCNC: 7.1 G/DL (ref 13.5–17.5)
LYMPHOCYTES # BLD: 1.2 K/UL (ref 1–5.1)
LYMPHOCYTES NFR BLD: 4.8 %
MCH RBC QN AUTO: 29.1 PG (ref 26–34)
MCHC RBC AUTO-ENTMCNC: 31.9 G/DL (ref 31–36)
MCV RBC AUTO: 91.2 FL (ref 80–100)
MONOCYTES # BLD: 1.5 K/UL (ref 0–1.3)
MONOCYTES NFR BLD: 6.3 %
NEUTROPHILS # BLD: 21.4 K/UL (ref 1.7–7.7)
NEUTROPHILS NFR BLD: 88.6 %
PERFORMED ON: ABNORMAL
PLATELET # BLD AUTO: 650 K/UL (ref 135–450)
PMV BLD AUTO: 6.8 FL (ref 5–10.5)
POTASSIUM SERPL-SCNC: 3.8 MMOL/L (ref 3.5–5.1)
RBC # BLD AUTO: 2.43 M/UL (ref 4.2–5.9)
SODIUM SERPL-SCNC: 136 MMOL/L (ref 136–145)
WBC # BLD AUTO: 24.2 K/UL (ref 4–11)

## 2024-02-13 PROCEDURE — 85025 COMPLETE CBC W/AUTO DIFF WBC: CPT

## 2024-02-13 PROCEDURE — 2580000003 HC RX 258: Performed by: PODIATRIST

## 2024-02-13 PROCEDURE — 7100000001 HC PACU RECOVERY - ADDTL 15 MIN: Performed by: SURGERY

## 2024-02-13 PROCEDURE — 88307 TISSUE EXAM BY PATHOLOGIST: CPT

## 2024-02-13 PROCEDURE — 94640 AIRWAY INHALATION TREATMENT: CPT

## 2024-02-13 PROCEDURE — 3700000000 HC ANESTHESIA ATTENDED CARE: Performed by: SURGERY

## 2024-02-13 PROCEDURE — 2500000003 HC RX 250 WO HCPCS: Performed by: NURSE ANESTHETIST, CERTIFIED REGISTERED

## 2024-02-13 PROCEDURE — 86140 C-REACTIVE PROTEIN: CPT

## 2024-02-13 PROCEDURE — 80048 BASIC METABOLIC PNL TOTAL CA: CPT

## 2024-02-13 PROCEDURE — 0Y6H0Z1 DETACHMENT AT RIGHT LOWER LEG, HIGH, OPEN APPROACH: ICD-10-PCS | Performed by: SURGERY

## 2024-02-13 PROCEDURE — 1200000000 HC SEMI PRIVATE

## 2024-02-13 PROCEDURE — 99222 1ST HOSP IP/OBS MODERATE 55: CPT | Performed by: SURGERY

## 2024-02-13 PROCEDURE — 3700000001 HC ADD 15 MINUTES (ANESTHESIA): Performed by: SURGERY

## 2024-02-13 PROCEDURE — 6360000002 HC RX W HCPCS: Performed by: ANESTHESIOLOGY

## 2024-02-13 PROCEDURE — A4217 STERILE WATER/SALINE, 500 ML: HCPCS | Performed by: SURGERY

## 2024-02-13 PROCEDURE — 3600000017 HC SURGERY HYBRID ADDL 15MIN: Performed by: SURGERY

## 2024-02-13 PROCEDURE — 2709999900 HC NON-CHARGEABLE SUPPLY: Performed by: SURGERY

## 2024-02-13 PROCEDURE — 6360000002 HC RX W HCPCS: Performed by: PODIATRIST

## 2024-02-13 PROCEDURE — 2580000003 HC RX 258: Performed by: NURSE ANESTHETIST, CERTIFIED REGISTERED

## 2024-02-13 PROCEDURE — 2580000003 HC RX 258: Performed by: SURGERY

## 2024-02-13 PROCEDURE — 36415 COLL VENOUS BLD VENIPUNCTURE: CPT

## 2024-02-13 PROCEDURE — 3600000007 HC SURGERY HYBRID BASE: Performed by: SURGERY

## 2024-02-13 PROCEDURE — 27882 AMPUTATION OF LOWER LEG: CPT | Performed by: SURGERY

## 2024-02-13 PROCEDURE — 6370000000 HC RX 637 (ALT 250 FOR IP): Performed by: PODIATRIST

## 2024-02-13 PROCEDURE — 99232 SBSQ HOSP IP/OBS MODERATE 35: CPT | Performed by: INTERNAL MEDICINE

## 2024-02-13 PROCEDURE — 6360000002 HC RX W HCPCS: Performed by: NURSE ANESTHETIST, CERTIFIED REGISTERED

## 2024-02-13 PROCEDURE — 88311 DECALCIFY TISSUE: CPT

## 2024-02-13 PROCEDURE — 6360000002 HC RX W HCPCS: Performed by: SURGERY

## 2024-02-13 PROCEDURE — 7100000000 HC PACU RECOVERY - FIRST 15 MIN: Performed by: SURGERY

## 2024-02-13 RX ORDER — SODIUM CHLORIDE 0.9 % (FLUSH) 0.9 %
5-40 SYRINGE (ML) INJECTION EVERY 12 HOURS SCHEDULED
Status: DISCONTINUED | OUTPATIENT
Start: 2024-02-13 | End: 2024-02-13 | Stop reason: HOSPADM

## 2024-02-13 RX ORDER — SODIUM CHLORIDE, SODIUM LACTATE, POTASSIUM CHLORIDE, CALCIUM CHLORIDE 600; 310; 30; 20 MG/100ML; MG/100ML; MG/100ML; MG/100ML
INJECTION, SOLUTION INTRAVENOUS CONTINUOUS PRN
Status: DISCONTINUED | OUTPATIENT
Start: 2024-02-13 | End: 2024-02-13 | Stop reason: SDUPTHER

## 2024-02-13 RX ORDER — MIDAZOLAM HYDROCHLORIDE 1 MG/ML
2 INJECTION INTRAMUSCULAR; INTRAVENOUS
Status: DISCONTINUED | OUTPATIENT
Start: 2024-02-13 | End: 2024-02-13 | Stop reason: HOSPADM

## 2024-02-13 RX ORDER — FENTANYL CITRATE 50 UG/ML
INJECTION, SOLUTION INTRAMUSCULAR; INTRAVENOUS PRN
Status: DISCONTINUED | OUTPATIENT
Start: 2024-02-13 | End: 2024-02-13 | Stop reason: SDUPTHER

## 2024-02-13 RX ORDER — PROPOFOL 10 MG/ML
INJECTION, EMULSION INTRAVENOUS PRN
Status: DISCONTINUED | OUTPATIENT
Start: 2024-02-13 | End: 2024-02-13 | Stop reason: SDUPTHER

## 2024-02-13 RX ORDER — SODIUM CHLORIDE 0.9 % (FLUSH) 0.9 %
5-40 SYRINGE (ML) INJECTION PRN
Status: DISCONTINUED | OUTPATIENT
Start: 2024-02-13 | End: 2024-02-13 | Stop reason: HOSPADM

## 2024-02-13 RX ORDER — OXYCODONE HYDROCHLORIDE 5 MG/1
5 TABLET ORAL PRN
Status: DISCONTINUED | OUTPATIENT
Start: 2024-02-13 | End: 2024-02-13 | Stop reason: HOSPADM

## 2024-02-13 RX ORDER — LIDOCAINE HYDROCHLORIDE 20 MG/ML
INJECTION, SOLUTION INFILTRATION; PERINEURAL PRN
Status: DISCONTINUED | OUTPATIENT
Start: 2024-02-13 | End: 2024-02-13 | Stop reason: SDUPTHER

## 2024-02-13 RX ORDER — ROCURONIUM BROMIDE 10 MG/ML
INJECTION, SOLUTION INTRAVENOUS PRN
Status: DISCONTINUED | OUTPATIENT
Start: 2024-02-13 | End: 2024-02-13 | Stop reason: SDUPTHER

## 2024-02-13 RX ORDER — SODIUM CHLORIDE 9 MG/ML
INJECTION, SOLUTION INTRAVENOUS PRN
Status: DISCONTINUED | OUTPATIENT
Start: 2024-02-13 | End: 2024-02-13 | Stop reason: HOSPADM

## 2024-02-13 RX ORDER — ONDANSETRON 2 MG/ML
4 INJECTION INTRAMUSCULAR; INTRAVENOUS ONCE
Status: DISCONTINUED | OUTPATIENT
Start: 2024-02-13 | End: 2024-02-13 | Stop reason: HOSPADM

## 2024-02-13 RX ORDER — ONDANSETRON 2 MG/ML
INJECTION INTRAMUSCULAR; INTRAVENOUS PRN
Status: DISCONTINUED | OUTPATIENT
Start: 2024-02-13 | End: 2024-02-13 | Stop reason: SDUPTHER

## 2024-02-13 RX ORDER — OXYCODONE HYDROCHLORIDE 5 MG/1
10 TABLET ORAL PRN
Status: DISCONTINUED | OUTPATIENT
Start: 2024-02-13 | End: 2024-02-13 | Stop reason: HOSPADM

## 2024-02-13 RX ORDER — MEPERIDINE HYDROCHLORIDE 50 MG/ML
12.5 INJECTION INTRAMUSCULAR; INTRAVENOUS; SUBCUTANEOUS EVERY 5 MIN PRN
Status: DISCONTINUED | OUTPATIENT
Start: 2024-02-13 | End: 2024-02-13 | Stop reason: HOSPADM

## 2024-02-13 RX ORDER — DIPHENHYDRAMINE HYDROCHLORIDE 50 MG/ML
6.25 INJECTION INTRAMUSCULAR; INTRAVENOUS
Status: DISCONTINUED | OUTPATIENT
Start: 2024-02-13 | End: 2024-02-13 | Stop reason: HOSPADM

## 2024-02-13 RX ADMIN — CEFEPIME 2000 MG: 2 INJECTION, POWDER, FOR SOLUTION INTRAVENOUS at 18:11

## 2024-02-13 RX ADMIN — SODIUM CHLORIDE, SODIUM LACTATE, POTASSIUM CHLORIDE, AND CALCIUM CHLORIDE: .6; .31; .03; .02 INJECTION, SOLUTION INTRAVENOUS at 10:03

## 2024-02-13 RX ADMIN — ONDANSETRON 4 MG: 2 INJECTION INTRAMUSCULAR; INTRAVENOUS at 10:33

## 2024-02-13 RX ADMIN — MORPHINE SULFATE 4 MG: 4 INJECTION, SOLUTION INTRAMUSCULAR; INTRAVENOUS at 00:06

## 2024-02-13 RX ADMIN — SUGAMMADEX 200 MG: 100 INJECTION, SOLUTION INTRAVENOUS at 10:43

## 2024-02-13 RX ADMIN — CEFEPIME 2000 MG: 2 INJECTION, POWDER, FOR SOLUTION INTRAVENOUS at 09:21

## 2024-02-13 RX ADMIN — HYDROMORPHONE HYDROCHLORIDE 0.5 MG: 1 INJECTION, SOLUTION INTRAMUSCULAR; INTRAVENOUS; SUBCUTANEOUS at 11:35

## 2024-02-13 RX ADMIN — HYDROMORPHONE HYDROCHLORIDE 0.5 MG: 1 INJECTION, SOLUTION INTRAMUSCULAR; INTRAVENOUS; SUBCUTANEOUS at 11:08

## 2024-02-13 RX ADMIN — METRONIDAZOLE 500 MG: 500 INJECTION, SOLUTION INTRAVENOUS at 18:12

## 2024-02-13 RX ADMIN — SODIUM CHLORIDE: 9 INJECTION, SOLUTION INTRAVENOUS at 02:44

## 2024-02-13 RX ADMIN — METRONIDAZOLE 500 MG: 500 INJECTION, SOLUTION INTRAVENOUS at 01:48

## 2024-02-13 RX ADMIN — MORPHINE SULFATE 4 MG: 4 INJECTION, SOLUTION INTRAMUSCULAR; INTRAVENOUS at 09:23

## 2024-02-13 RX ADMIN — MORPHINE SULFATE 4 MG: 4 INJECTION, SOLUTION INTRAMUSCULAR; INTRAVENOUS at 03:58

## 2024-02-13 RX ADMIN — CEFEPIME 2000 MG: 2 INJECTION, POWDER, FOR SOLUTION INTRAVENOUS at 02:45

## 2024-02-13 RX ADMIN — FENTANYL CITRATE 25 MCG: 50 INJECTION, SOLUTION INTRAMUSCULAR; INTRAVENOUS at 10:36

## 2024-02-13 RX ADMIN — Medication 10 ML: at 00:06

## 2024-02-13 RX ADMIN — OXYCODONE AND ACETAMINOPHEN 1 TABLET: 7.5; 325 TABLET ORAL at 23:51

## 2024-02-13 RX ADMIN — VANCOMYCIN HYDROCHLORIDE 1000 MG: 1 INJECTION, POWDER, LYOPHILIZED, FOR SOLUTION INTRAVENOUS at 05:48

## 2024-02-13 RX ADMIN — METRONIDAZOLE 500 MG: 500 INJECTION, SOLUTION INTRAVENOUS at 09:20

## 2024-02-13 RX ADMIN — ROCURONIUM BROMIDE 50 MG: 50 INJECTION, SOLUTION INTRAVENOUS at 10:15

## 2024-02-13 RX ADMIN — FENTANYL CITRATE 25 MCG: 50 INJECTION, SOLUTION INTRAMUSCULAR; INTRAVENOUS at 10:24

## 2024-02-13 RX ADMIN — LIDOCAINE HYDROCHLORIDE 60 MG: 20 INJECTION, SOLUTION INFILTRATION; PERINEURAL at 10:15

## 2024-02-13 RX ADMIN — OXYCODONE AND ACETAMINOPHEN 1 TABLET: 7.5; 325 TABLET ORAL at 05:49

## 2024-02-13 RX ADMIN — FENTANYL CITRATE 25 MCG: 50 INJECTION, SOLUTION INTRAMUSCULAR; INTRAVENOUS at 10:28

## 2024-02-13 RX ADMIN — FENTANYL CITRATE 25 MCG: 50 INJECTION, SOLUTION INTRAMUSCULAR; INTRAVENOUS at 10:30

## 2024-02-13 RX ADMIN — POLYETHYLENE GLYCOL 3350 17 G: 17 POWDER, FOR SOLUTION ORAL at 18:10

## 2024-02-13 RX ADMIN — HYDROMORPHONE HYDROCHLORIDE 0.5 MG: 1 INJECTION, SOLUTION INTRAMUSCULAR; INTRAVENOUS; SUBCUTANEOUS at 11:21

## 2024-02-13 RX ADMIN — OXYCODONE AND ACETAMINOPHEN 1 TABLET: 7.5; 325 TABLET ORAL at 01:07

## 2024-02-13 RX ADMIN — Medication 2 PUFF: at 09:10

## 2024-02-13 RX ADMIN — PROPOFOL 60 MG: 10 INJECTION, EMULSION INTRAVENOUS at 10:15

## 2024-02-13 RX ADMIN — SODIUM CHLORIDE: 9 INJECTION, SOLUTION INTRAVENOUS at 05:44

## 2024-02-13 RX ADMIN — SODIUM CHLORIDE 25 ML: 9 INJECTION, SOLUTION INTRAVENOUS at 01:47

## 2024-02-13 ASSESSMENT — PAIN SCALES - GENERAL
PAINLEVEL_OUTOF10: 0
PAINLEVEL_OUTOF10: 5
PAINLEVEL_OUTOF10: 8
PAINLEVEL_OUTOF10: 7
PAINLEVEL_OUTOF10: 7
PAINLEVEL_OUTOF10: 9
PAINLEVEL_OUTOF10: 9
PAINLEVEL_OUTOF10: 8
PAINLEVEL_OUTOF10: 9
PAINLEVEL_OUTOF10: 8
PAINLEVEL_OUTOF10: 7
PAINLEVEL_OUTOF10: 8
PAINLEVEL_OUTOF10: 7

## 2024-02-13 ASSESSMENT — PAIN DESCRIPTION - ONSET
ONSET: ON-GOING
ONSET: ON-GOING

## 2024-02-13 ASSESSMENT — PAIN DESCRIPTION - LOCATION
LOCATION: FOOT
LOCATION: LEG
LOCATION: LEG
LOCATION: FOOT

## 2024-02-13 ASSESSMENT — PAIN DESCRIPTION - DESCRIPTORS
DESCRIPTORS: ACHING;CRAMPING;DISCOMFORT
DESCRIPTORS: ACHING
DESCRIPTORS: ACHING
DESCRIPTORS: ACHING;SHOOTING
DESCRIPTORS: ACHING
DESCRIPTORS: ACHING;SHARP

## 2024-02-13 ASSESSMENT — PAIN - FUNCTIONAL ASSESSMENT
PAIN_FUNCTIONAL_ASSESSMENT: 0-10
PAIN_FUNCTIONAL_ASSESSMENT: PREVENTS OR INTERFERES SOME ACTIVE ACTIVITIES AND ADLS

## 2024-02-13 ASSESSMENT — PAIN DESCRIPTION - ORIENTATION
ORIENTATION: RIGHT

## 2024-02-13 ASSESSMENT — PAIN DESCRIPTION - PAIN TYPE
TYPE: ACUTE PAIN
TYPE: ACUTE PAIN

## 2024-02-13 ASSESSMENT — PAIN DESCRIPTION - FREQUENCY
FREQUENCY: CONTINUOUS
FREQUENCY: CONTINUOUS

## 2024-02-13 NOTE — CONSULTS
Vascular Surgery Consultation    Date of Admission:  2/9/2024 11:07 PM  Date of Consultation:  2/13/2024    PCP:  No primary care provider on file.       Chief Complaint: Right foot infection.     History of Present Illness:   We are asked to see this patient in consultation by Dr. Slater regarding need for amputation.   Jackson Martínez is a 73 y.o. male who has a history of DM, presented with 2 week history of worsening pain and drainage from right foot.  He had an MRI performed showing extensive infection and osteo throughout right foot.  He was taken to OR yesterday by Podiatry for I and D with these findings:  FINDINGS:  Liquefied necrosis of the forefoot, midfoot and septic  arthritis multiple joints nonviable foot.  I have been consulted for amputation.        Past Medical History:  Past Medical History:   Diagnosis Date    Bronchitis        Past Surgical History:  Past Surgical History:   Procedure Laterality Date    ADENOIDECTOMY      NASAL SEPTUM SURGERY      SPLENECTOMY      TONSILLECTOMY         Home Medications:   Prior to Admission medications    Medication Sig Start Date End Date Taking? Authorizing Provider   fluticasone (FLONASE) 50 MCG/ACT nasal spray 1 spray by Nasal route daily. 3/26/15   Denis Ennis APRN - CNP   naproxen (NAPROSYN) 500 MG tablet Take 1 tablet by mouth 2 times daily.  Patient not taking: Reported on 2/10/2024 3/26/15   Denis Ennis APRN - CNP   traMADol (ULTRAM) 50 MG tablet Take 1 tablet by mouth every 6 hours as needed for Pain. 3/26/15   Denis Ennis APRN - CNP   methocarbamol (ROBAXIN) 500 MG tablet Take 1 tablet by mouth 3 times daily. 1/5/15   Orestes Olivarez PA   fluticasone-salmeterol (ADVAIR) 100-50 MCG/DOSE diskus inhaler Inhale 1 puff into the lungs in the morning and 1 puff in the evening.    Provider, MD Griselda   ipratropium-albuterol (DUONEB) 0.5-2.5 (3) MG/3ML SOLN nebulizer solution Inhale 3 mLs into the lungs every 4  mild necrosis.  There are complex fluid  collections extending along the dorsum of the MTP joints.  These is likely  communicate with the MTP joints.  There is synovitis with evidence of  cartilage loss at the 3rd MTP joint.  Synovitis is suspected at each of the  MTP joints.  Multilocular phlegmon extends throughout the subcutaneous  tissues of the forefoot.  Subcutaneous edema is present.  The infectious  process extends off of the proximal aspect of the exam.  There is a small  amount of fluid within the extensor tendon sheaths, incompletely imaged.     IMPRESSION:  Severe infectious change throughout the imaged portion of the right foot.     Multifocal osteomyelitis of the forefoot and midfoot, as discussed above.  This most severely involves the 3rd toe and MTP joint.     Suspected multifocal septic arthritis and osteomyelitis.     Severe diffuse myositis with suspected myonecrosis.     Phlegmonous change throughout the subcutaneous tissues of the forefoot.     Prompt surgical consultation is recommended.  Consider imaging of the ankle  and leg to evaluate the extent of disease.         Labs:   CBC:   Recent Labs     02/11/24  0840 02/12/24  1009 02/13/24  0751   WBC 25.0* 21.0* 24.2*   HGB 7.7* 8.0* 7.1*   HCT 23.8* 24.8* 22.2*   MCV 90.9 90.2 91.2   * 663* 650*     BMP:   Recent Labs     02/11/24  0840 02/12/24  1009 02/13/24  0751    137 136   K 3.7 3.9 3.8    106 105   CO2 24 24 24   BUN 19 18 14   CREATININE 0.6* 0.6* 0.6*   CALCIUM 8.1* 8.2* 7.8*     Cardiac Enzymes: No results for input(s): \"CKTOTAL\", \"CKMB\", \"CKMBINDEX\", \"TROPONINI\" in the last 72 hours.  PT/INR: No results for input(s): \"PROTIME\", \"INR\" in the last 72 hours.  APTT: No results for input(s): \"APTT\" in the last 72 hours.  Liver Profile:  Lab Results   Component Value Date/Time    AST 26 02/10/2024 06:45 AM    ALT 20 02/10/2024 06:45 AM    BILITOT 0.8 02/10/2024 06:45 AM    ALKPHOS 163 02/10/2024 06:45 AM   No results

## 2024-02-13 NOTE — PROGRESS NOTES
Patient out to PACU bay 9. VSS on room air. Patient right foot wrapped and is clean, dry and intact. Patient complaining of pain on arrival. Will medicate as appropriate.

## 2024-02-13 NOTE — OP NOTE
[address] [address]                                OPERATIVE REPORT    PATIENT NAME: NAKUL SALDIVAR                   :        1950  MED REC NO:   [id number]                          ROOM:  ACCOUNT NO:   [id number]                           ADMIT DATE: 2024  PROVIDER:     Florinda Slater DPM    DATE OF PROCEDURE:  2024    PREOPERATIVE DIAGNOSES:  1.  Osteomyelitis right forefoot and midfoot.  2.  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    POSTOPERATIVE DIAGNOSES:  1.  Osteomyelitis right forefoot and midfoot.  2.  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    PROCEDURE:  Right foot incision and debridement, multiple sites right  foot with third toe amputation.    SURGEON:  Florinda Slater DPM    ASSISTANT:  Kamaljit Cordero    ANESTHESIA:  General    ESTIMATED BLOOD LOSS:  Less than 20 ml.    COMPLICATIONS:  None.    SPECIMENS:  Cultures x5.    DRAINS:  Iodoform packing.    HEMOSTASIS:  Pneumatic ankle tourniquet 250 mmHg.    FINDINGS:  Liquefied necrosis of the forefoot, midfoot and septic  arthritis multiple joints nonviable foot.    PROCEDURE IN DETAIL:  The patient was emergently brought to the  operating room, placed on the operating table in supine position.   Following general sedation, the right foot and leg were scrubbed,  prepped and draped in usual aseptic manner.  Attention was then directed  to the right foot where there was moderate areas of abscess noted to the  plantar aspect of the right foot.  It was thick purulent drainage noted  extending from the third toe in MPJ.  Incision was placed around the  third toe which was also disarticulated at the level of the MPJ with  moderate thick white to yellow purulence exuding from all areas of the  forefoot.  The incision was tracked dorsally up to the area of the  lesser

## 2024-02-13 NOTE — ANESTHESIA PRE PROCEDURE
Department of Anesthesiology  Preprocedure Note       Name:  Jackson Martínez   Age:  73 y.o.  :  1950                                          MRN:  [id number]         Date:  2024      Surgeon: Surgeon(s):  Agus Montalvo MD    Procedure: Procedure(s):  RIGHT LOWER EXTREMITY GUILLOTINE AMPUTATION    Medications prior to admission:   Prior to Admission medications    Medication Sig Start Date End Date Taking? Authorizing Provider   fluticasone (FLONASE) 50 MCG/ACT nasal spray 1 spray by Nasal route daily. 3/26/15   Raymon, Tomorrow, APRN - CNP   naproxen (NAPROSYN) 500 MG tablet Take 1 tablet by mouth 2 times daily.  Patient not taking: Reported on 2/10/2024 3/26/15   Raymon, Tomorrow, APRN - CNP   traMADol (ULTRAM) 50 MG tablet Take 1 tablet by mouth every 6 hours as needed for Pain. 3/26/15   Raymon, Tomorrow, APRN - CNP   methocarbamol (ROBAXIN) 500 MG tablet Take 1 tablet by mouth 3 times daily. 1/5/15   Orestes Olivarez PA   fluticasone-salmeterol (ADVAIR) 100-50 MCG/DOSE diskus inhaler Inhale 1 puff into the lungs in the morning and 1 puff in the evening.    Provider, MD Griselda   ipratropium-albuterol (DUONEB) 0.5-2.5 (3) MG/3ML SOLN nebulizer solution Inhale 3 mLs into the lungs every 4 hours. 11   Deion Ochoa MD       Current medications:    Current Facility-Administered Medications   Medication Dose Route Frequency Provider Last Rate Last Admin   • ipratropium 0.5 mg-albuterol 2.5 mg (DUONEB) nebulizer solution 1 Dose  1 Dose Inhalation Q4H PRN Florinda Slater DPM       • vancomycin 1000 mg IVPB in 250 mL NS addavial  1,000 mg IntraVENous Q12H Florinda Slater DPM   Stopped at 24 0701   • oxyCODONE-acetaminophen (PERCOCET) 7.5-325 MG per tablet 1 tablet  1 tablet Oral Q4H PRN Florinda Slater DPM   1 tablet at 24 0549   • morphine sulfate (PF) injection 4 mg  4 mg IntraVENous Q4H PRN Florinda Slater, MERRICK   4 mg at 24 8344   • sodium chloride  5

## 2024-02-13 NOTE — PROGRESS NOTES
Pt brought to PACU. Report obtained from OR RN and anesthesia. Pt placed on monitor SR and O2 at  RA.ace wrap to right lower leg.

## 2024-02-13 NOTE — PROGRESS NOTES
Patient meeting discharge criteria to return to inpatient unit. Patient transported at this time in stable condition and in no acute distress.

## 2024-02-13 NOTE — PROGRESS NOTES
Hospital Medicine Progress Note      Date of Admission: 2/9/2024  Hospital Day: 5      Chief Admission Complaint:  rt foot pain     Subjective:  resting in bed, aware of needing amputation, going for surgery today     Presenting Admission History:          Jackson Martínez is a 73 y.o. male with pmh of tobacco abuse who presents with ongoing right foot pain with wound and drainage, found to be in severe sepsis.      In the ER, patient was hypertensive on arrival, tachycardic 110s. XR right foot on arrival showing bony resorption of 3rd toe suspicious for osteomyelitis with soft tissue swelling and neg soft tissue gas. Labwork significant for Lactate 2.8, .8, , leukocytosis of 29.4, Hgb of 9.2. Received sepsis bolus, IV Vanc and Zosyn x 1. Patient admitted to the floor for continuous monitoring, IV meds and fluids, specialty evaluation.      Patient states his symptoms started 2 weeks ago when he cut his right third toenail with a clipper and developed a wound. Has had progressively worsening pain, drainage, bleeding, and difficulty bearing weight on his right foot due to the pain.  Did not seek care at an urgent care.  Has only taken Tylenol or Advil at home as needed for pain.  He denies prior history of similar wounds, diabetes, neuropathy, history of hypertension or hyperlipidemia. Mentions he had a \"pain pill problem\" years ago.  He denies recent fever, chills, chest pain, SOB, abdominal pain, nausea or vomiting, diarrhea constipation, hematuria or dysuria, bloody stools, headaches, dizziness, acute vision or hearing changes, LOC, numbness or tingling.        Assessment/Plan:       Current Principal Problem:  Sepsis (HCC)       Sepsis secondary osteomyelitis  X-ray of foot was consistent with osteomyelitis.  Podiatry and ID were consulted, apprec recs and mgmt  Follow-up MRI for surgical planning  I IV antibiotics: Vancomycin plus cefepime plus Flagyl  Follow-up culture results  Pain control:  Acute/Chronic Illness/injury posing threat to life or bodily function:    [] Severe exacerbation of chronic illness:    ---------------------------------------------------------------------  B. Risk of Treatment (any 1)   [] Drugs/treatments that require intensive monitoring for toxicity include:    [] Change in code status:    [] Decision to escalate care:    [] Major surgery/procedure with associated risk factors:    ----------------------------------------------------------------------  C. Data (any 2)  [x] Discussed current management and discharge planning options with Case Management.  [] Discussed management of the case with:    [] Telemetry personally reviewed and interpreted as documented above    [] Imaging personally reviewed and interpreted, includes:    [x] Data Review (any 3)  [] Collateral history obtained from:    [x] All available Consultant notes from yesterday/today were reviewed  [x] All current labs were reviewed and interpreted for clinical significance   [x] Appropriate follow-up labs were ordered    Medications:  Personally reviewed in detail in conjunction w/ labs as documented for evidence of drug toxicity.     Infusion Medications    sodium chloride 20 mL/hr at 02/13/24 1004    dextrose       Scheduled Medications    vancomycin  1,000 mg IntraVENous Q12H    sodium chloride flush  5-40 mL IntraVENous 2 times per day    nicotine  1 patch TransDERmal Daily    fluticasone  1 spray Nasal Daily    mometasone-formoterol  2 puff Inhalation BID RT    insulin lispro  0-4 Units SubCUTAneous TID WC    insulin lispro  0-4 Units SubCUTAneous Nightly    cefepime  2,000 mg IntraVENous Q8H    metroNIDAZOLE  500 mg IntraVENous 3 times per day     PRN Meds: ipratropium 0.5 mg-albuterol 2.5 mg, oxyCODONE-acetaminophen, morphine, sodium chloride flush, sodium chloride, ondansetron **OR** ondansetron, polyethylene glycol, acetaminophen **OR** acetaminophen, glucose, dextrose bolus **OR** dextrose bolus,  Quality 226: Preventive Care And Screening: Tobacco Use: Screening And Cessation Intervention: Patient screened for tobacco use and is an ex/non-smoker Quality 111:Pneumonia Vaccination Status For Older Adults: Pneumococcal Vaccination Previously Received Quality 130: Documentation Of Current Medications In The Medical Record: Current Medications Documented Quality 431: Preventive Care And Screening: Unhealthy Alcohol Use - Screening: Patient screened for unhealthy alcohol use using a single question and scores less than 2 times per year Detail Level: Detailed Quality 47: Advance Care Plan: Advance Care Planning discussed and documented in the medical record; patient did not wish or was not able to name a surrogate decision maker or provide an advance care plan.

## 2024-02-13 NOTE — PROGRESS NOTES
Department of Orthopedic Surgery  Dr. Florinda Slater  Progress Note        OBJECTIVE    Physical    VITALS:  BP (!) 102/53   Pulse 66   Temp 98.3 °F (36.8 °C) (Oral)   Resp 18   Ht 1.753 m (5' 9\")   Wt 68 kg (150 lb)   SpO2 98%   BMI 22.15 kg/m²   Gen: AAO x3, NAD   Derm: Moderate area of purulence noted to right plantar foot with crepitus now noted to 3rd MPJ and developing abscesses are noted to multiple area of plantar foot    Vasc:  Non palpation DP pulse right and barely palpable PT pulse right.  CFT>5 seconds in all digits.    Neuro:  Gross sensation diminished bilateral  Ortho:  Unstable third toe right foot with manipulation.   Data    CBC with Differential:    Lab Results   Component Value Date/Time    WBC 21.0 02/12/2024 10:09 AM    RBC 2.75 02/12/2024 10:09 AM    HGB 8.0 02/12/2024 10:09 AM    HCT 24.8 02/12/2024 10:09 AM     02/12/2024 10:09 AM    MCV 90.2 02/12/2024 10:09 AM    MCH 29.3 02/12/2024 10:09 AM    MCHC 32.5 02/12/2024 10:09 AM    RDW 13.9 02/12/2024 10:09 AM    SEGSPCT 79.6 10/02/2011 12:05 AM    LYMPHOPCT 5.8 02/12/2024 10:09 AM    MONOPCT 6.8 02/12/2024 10:09 AM    EOSPCT 4.3 10/02/2011 12:05 AM    BASOPCT 0.2 02/12/2024 10:09 AM    MONOSABS 1.4 02/12/2024 10:09 AM    LYMPHSABS 1.2 02/12/2024 10:09 AM    EOSABS 0.0 02/12/2024 10:09 AM    BASOSABS 0.0 02/12/2024 10:09 AM    DIFFTYPE Auto 10/02/2011 12:05 AM     CMP:    Lab Results   Component Value Date/Time     02/12/2024 10:09 AM    K 3.9 02/12/2024 10:09 AM     02/12/2024 10:09 AM    CO2 24 02/12/2024 10:09 AM    BUN 18 02/12/2024 10:09 AM    CREATININE 0.6 02/12/2024 10:09 AM    GFRAA >60 10/02/2011 12:05 AM    AGRATIO 0.5 02/10/2024 06:45 AM    LABGLOM >60 02/12/2024 10:09 AM    GLUCOSE 128 02/12/2024 10:09 AM    PROT 6.9 02/10/2024 06:45 AM    PROT 8.2 10/02/2011 12:05 AM    LABALBU 2.3 02/10/2024 06:45 AM    CALCIUM 8.2 02/12/2024 10:09 AM    BILITOT 0.8 02/10/2024 06:45 AM    ALKPHOS 163 02/10/2024 06:45 AM  extending throughout the intrinsic foot musculature.  There  are areas of decreased T1 and T2 signal within the central aspect of the  musculature which may reflect mild necrosis.  There are complex fluid  collections extending along the dorsum of the MTP joints.  These is likely  communicate with the MTP joints.  There is synovitis with evidence of  cartilage loss at the 3rd MTP joint.  Synovitis is suspected at each of the  MTP joints.  Multilocular phlegmon extends throughout the subcutaneous  tissues of the forefoot.  Subcutaneous edema is present.  The infectious  process extends off of the proximal aspect of the exam.  There is a small  amount of fluid within the extensor tendon sheaths, incompletely imaged.     IMPRESSION:  Severe infectious change throughout the imaged portion of the right foot.     Multifocal osteomyelitis of the forefoot and midfoot, as discussed above.  This most severely involves the 3rd toe and MTP joint.     Suspected multifocal septic arthritis and osteomyelitis.     Severe diffuse myositis with suspected myonecrosis.     Phlegmonous change throughout the subcutaneous tissues of the forefoot.     Prompt surgical consultation is recommended.  Consider imaging of the ankle  and leg to evaluate the extent of disease.    Current Inpatient Medications    Current Facility-Administered Medications: ipratropium 0.5 mg-albuterol 2.5 mg (DUONEB) nebulizer solution 1 Dose, 1 Dose, Inhalation, Q4H PRN  vancomycin 1000 mg IVPB in 250 mL NS addavial, 1,000 mg, IntraVENous, Q12H  oxyCODONE-acetaminophen (PERCOCET) 7.5-325 MG per tablet 1 tablet, 1 tablet, Oral, Q4H PRN  morphine sulfate (PF) injection 4 mg, 4 mg, IntraVENous, Q4H PRN  sodium chloride flush 0.9 % injection 5-40 mL, 5-40 mL, IntraVENous, 2 times per day  sodium chloride flush 0.9 % injection 5-40 mL, 5-40 mL, IntraVENous, PRN  0.9 % sodium chloride infusion, , IntraVENous, PRN  ondansetron (ZOFRAN-ODT) disintegrating tablet 4 mg, 4

## 2024-02-13 NOTE — PLAN OF CARE
Problem: ABCDS Injury Assessment  Goal: Absence of physical injury  2/13/2024 0342 by Baltazar Brooks RN  Outcome: Progressing  2/12/2024 1842 by Lucy Bentley RN  Outcome: Progressing     Problem: Safety - Adult  Goal: Free from fall injury  2/13/2024 0342 by Baltazar Brooks RN  Outcome: Progressing  2/12/2024 1842 by Lucy Bentley RN  Outcome: Progressing     Problem: Pain  Goal: Verbalizes/displays adequate comfort level or baseline comfort level  2/13/2024 0342 by Baltazar Brooks RN  Outcome: Progressing  2/12/2024 1842 by Lcuy Bentley RN  Outcome: Progressing     Problem: Skin/Tissue Integrity - Adult  Goal: Incisions, wounds, or drain sites healing without S/S of infection  2/13/2024 0342 by Baltazar Brooks RN  Outcome: Not Progressing  2/12/2024 1842 by Lucy Bentley RN  Outcome: Progressing

## 2024-02-13 NOTE — PLAN OF CARE
Problem: Discharge Planning  Goal: Discharge to home or other facility with appropriate resources  Outcome: Progressing     Problem: Pain  Goal: Verbalizes/displays adequate comfort level or baseline comfort level  Outcome: Progressing     Problem: Skin/Tissue Integrity  Goal: Absence of new skin breakdown  Description: 1.  Monitor for areas of redness and/or skin breakdown  2.  Assess vascular access sites hourly  3.  Every 4-6 hours minimum:  Change oxygen saturation probe site  4.  Every 4-6 hours:  If on nasal continuous positive airway pressure, respiratory therapy assess nares and determine need for appliance change or resting period.  Outcome: Progressing     Problem: Safety - Adult  Goal: Free from fall injury  Outcome: Progressing     Problem: ABCDS Injury Assessment  Goal: Absence of physical injury  Outcome: Progressing     Problem: Skin/Tissue Integrity - Adult  Goal: Skin integrity remains intact  Outcome: Progressing  Goal: Incisions, wounds, or drain sites healing without S/S of infection  Outcome: Progressing     Problem: Musculoskeletal - Adult  Goal: Return mobility to safest level of function  Outcome: Progressing     Problem: Genitourinary - Adult  Goal: Absence of urinary retention  Outcome: Progressing     Problem: Infection - Adult  Goal: Absence of infection at discharge  Outcome: Progressing  Goal: Absence of infection during hospitalization  Outcome: Progressing     Problem: Metabolic/Fluid and Electrolytes - Adult  Goal: Glucose maintained within prescribed range  Outcome: Progressing     Problem: Hematologic - Adult  Goal: Maintains hematologic stability  Outcome: Progressing     Problem: Chronic Conditions and Co-morbidities  Goal: Patient's chronic conditions and co-morbidity symptoms are monitored and maintained or improved  Outcome: Progressing

## 2024-02-13 NOTE — ANESTHESIA PRE PROCEDURE
Department of Anesthesiology  Preprocedure Note       Name:  Jackson Martínez   Age:  73 y.o.  :  1950                                          MRN:  [id number]         Date:  2024      Surgeon: Surgeon(s):  Florinda Slater DPM    Procedure: Procedure(s):  RIGHT FOOT DEBRIDEMENT INCISION AND DRAINAGE WITH RIGHT THIRD RAY/PARTIAL FOOT AMPUTATION    Medications prior to admission:   Prior to Admission medications    Medication Sig Start Date End Date Taking? Authorizing Provider   fluticasone (FLONASE) 50 MCG/ACT nasal spray 1 spray by Nasal route daily. 3/26/15   Raymon, Tomorrow, APRN - CNP   naproxen (NAPROSYN) 500 MG tablet Take 1 tablet by mouth 2 times daily.  Patient not taking: Reported on 2/10/2024 3/26/15   Raymon, Tomorrow, APRN - CNP   traMADol (ULTRAM) 50 MG tablet Take 1 tablet by mouth every 6 hours as needed for Pain. 3/26/15   Raymon, Tomorrow, APRN - CNP   methocarbamol (ROBAXIN) 500 MG tablet Take 1 tablet by mouth 3 times daily. 1/5/15   Orestes Olivarez PA   fluticasone-salmeterol (ADVAIR) 100-50 MCG/DOSE diskus inhaler Inhale 1 puff into the lungs in the morning and 1 puff in the evening.    Provider, MD Griselda   ipratropium-albuterol (DUONEB) 0.5-2.5 (3) MG/3ML SOLN nebulizer solution Inhale 3 mLs into the lungs every 4 hours. 11   Deion Ochoa MD       Current medications:    Current Facility-Administered Medications   Medication Dose Route Frequency Provider Last Rate Last Admin   • ipratropium 0.5 mg-albuterol 2.5 mg (DUONEB) nebulizer solution 1 Dose  1 Dose Inhalation Q4H PRN Sandra Pickard MD       • vancomycin 1000 mg IVPB in 250 mL NS addavial  1,000 mg IntraVENous Q12H Jono Baugh MD   Stopped at 24   • oxyCODONE-acetaminophen (PERCOCET) 7.5-325 MG per tablet 1 tablet  1 tablet Oral Q4H PRN Jono Baugh MD   1 tablet at 24 1831   • morphine sulfate (PF) injection 4 mg  4 mg IntraVENous Q4H PRN POONAM'Jaqueline, 
Jono VOSS MD   4 mg at 02/12/24 1518   • sodium chloride flush 0.9 % injection 5-40 mL  5-40 mL IntraVENous 2 times per day Orestes Moraes MD   10 mL at 02/12/24 1948   • sodium chloride flush 0.9 % injection 5-40 mL  5-40 mL IntraVENous PRN Orestes Moraes MD   10 mL at 02/11/24 0553   • 0.9 % sodium chloride infusion   IntraVENous PRN Orestes Moraes MD 20 mL/hr at 02/12/24 1947 New Bag at 02/12/24 1947   • ondansetron (ZOFRAN-ODT) disintegrating tablet 4 mg  4 mg Oral Q8H PRN Orestes Moraes MD        Or   • ondansetron (ZOFRAN) injection 4 mg  4 mg IntraVENous Q6H PRN Orestes Moraes MD       • polyethylene glycol (GLYCOLAX) packet 17 g  17 g Oral Daily PRN Orestes Moraes MD   17 g at 02/10/24 1059   • acetaminophen (TYLENOL) tablet 650 mg  650 mg Oral Q6H PRN Orestes Moraes MD        Or   • acetaminophen (TYLENOL) suppository 650 mg  650 mg Rectal Q6H PRN Orestes Moraes MD       • nicotine (NICODERM CQ) 21 MG/24HR 1 patch  1 patch TransDERmal Daily Orestes Moraes MD   1 patch at 02/12/24 0843   • enoxaparin (LOVENOX) injection 40 mg  40 mg SubCUTAneous Daily Orestes Moraes MD   40 mg at 02/12/24 0849   • fluticasone (FLONASE) 50 MCG/ACT nasal spray 1 spray  1 spray Nasal Daily Orestes Moraes MD   1 spray at 02/12/24 0843   • mometasone-formoterol (DULERA) 100-5 MCG/ACT inhaler 2 puff  2 puff Inhalation BID RT Orestes Moraes MD   2 puff at 02/11/24 1950   • glucose chewable tablet 16 g  4 tablet Oral PRN Jono Baugh MD       • dextrose bolus 10% 125 mL  125 mL IntraVENous PRN Jono Baugh MD        Or   • dextrose bolus 10% 250 mL  250 mL IntraVENous PRN Jono Baugh MD       • glucagon injection 1 mg  1 mg SubCUTAneous PRN Jono Baugh MD       • dextrose 10 % infusion   IntraVENous Continuous PRN Jono Baugh MD       • insulin lispro (HUMALOG) injection vial 0-4 Units  0-4 Units SubCUTAneous TID FREYA Baugh,

## 2024-02-13 NOTE — ANESTHESIA POSTPROCEDURE EVALUATION
Department of Anesthesiology  Postprocedure Note    Patient: Jackson Martínez  MRN: [id number]  YOB: 1950  Date of evaluation: 2/13/2024    Procedure Summary       Date: 02/13/24 Room / Location: [address]    Anesthesia Start: 1004 Anesthesia Stop: 1058    Procedure: RIGHT LOWER EXTREMITY GUILLOTINE AMPUTATION (Right: Leg Lower) Diagnosis:       Gangrene of right foot (HCC)      (Gangrene of right foot (HCC) [I96])    Surgeons: Agus Montalvo MD Responsible Provider: Lb Ureña MD    Anesthesia Type: general ASA Status: 4 - Emergent            Anesthesia Type: No value filed.    Enedina Phase I: Enedina Score: 10    Enedina Phase II:      Anesthesia Post Evaluation    Comments: Anes Post-op Note    Name:    Jackson Martínez  MRN:      [id number]    Patient Vitals in the past 12 hrs:  02/13/24 1130, Pulse:94, Resp:15, SpO2:91 %  02/13/24 1124, BP:130/71, Temp:98 °F (36.7 °C), Temp src:Temporal, Pulse:94, Resp:(!) 35, SpO2:98 %  02/13/24 1115, Pulse:97, Resp:20, SpO2:93 %  02/13/24 1109, BP:138/75, Pulse:(!) 101, Resp:20, SpO2:97 %  02/13/24 1054, BP:127/71, Temp:99.2 °F (37.3 °C), Temp src:Temporal, Pulse:89, Resp:(!) 35, SpO2:96 %  02/13/24 0953, BP:(!) 107/43, Temp:97.9 °F (36.6 °C), Temp src:Oral, Pulse:72, Resp:14, SpO2:95 %  02/13/24 0915, BP:(!) 104/56, Temp:98 °F (36.7 °C), Temp src:Oral, Pulse:73, Resp:18, SpO2:93 %  02/13/24 0619, Resp:18  02/13/24 0549, Resp:17  02/13/24 0345, BP:128/78, Temp:98.2 °F (36.8 °C), Temp src:Oral, Pulse:87, Resp:18  02/13/24 0137, Resp:16  02/13/24 0106, BP:134/83, Temp:97.6 °F (36.4 °C), Temp src:Oral, Pulse:98, Resp:18, SpO2:98 %  02/13/24 0036, Resp:17  02/13/24 0030, BP:123/68, Temp:97.6 °F (36.4 °C), Temp src:Axillary, Pulse:86, Resp:18, SpO2:93 %     LABS:    CBC  Lab Results       Component                Value               Date/Time                  WBC                      24.2 (H)            02/13/2024 07:51 AM

## 2024-02-13 NOTE — BRIEF OP NOTE
Brief Postoperative Note      Patient: Jackson Martínez  YOB: 1950  MRN: [id number]    Date of Procedure: 2/12/2024    Pre-op  diagnosis: osteomyelitis right forefoot and midfoot                                  Multifocal Septic arthritis and myonecrosis foot and ankle                               Post-Op Diagnosis: Same       Procedure(s):  RIGHT FOOT DEBRIDEMENT INCISION AND DRAINAGE OF MULTPILE SITES WITH RIGHT THIRD TOE AMPUTATION    Surgeon(s):  Florinda Slater DPM    Assistant:  Surgical Assistant: Kamaljit Zuñiga    Anesthesia: General    Estimated Blood Loss (mL): less than 20 ml     Complications: Other: none     Specimens:   ID Type Source Tests Collected by Time Destination   1 : RIGHT FOOT ALL 5 CULTURES Specimen Foot CULTURE, FUNGUS, CULTURE, SURGICAL, CULTURE WITH SMEAR, ACID FAST BACILLIUS Florinda Slater DPM 2/12/2024 2237        Implants:  * No implants in log *      Drains: iodoform packing     Findings: liquidified necrotis of forefoot,midfoot with septic arthritis - non viable foot       Electronically signed by Florinda Slater DPM on 2/12/2024 at 11:17 PM

## 2024-02-13 NOTE — CARE COORDINATION
Chart reviewed day 3. Care provided by podiatry, vascular, ID and IM. Pt is from home with his wife. He reports he was IPTA prior to this foot problem. Pt had RIGHT FOOT DEBRIDEMENT INCISION AND DRAINAGE OF MULTPILE SITES WITH RIGHT THIRD TOE AMPUTATION  last evening. Pt is going to have an amputation of his right foot this morning with Dr. Montalvo. Will continue to follow course for needs. Katharine Ba RN

## 2024-02-13 NOTE — PROGRESS NOTES
Patient awake, denies nausea, Dilaudid total of 1 mg given for pain. Report given to Lucy and put in for transport.

## 2024-02-13 NOTE — PROGRESS NOTES
Patient brought back to the floor A/O x 4 after procedure at 2355, VSS. Bed alarm on with wheels locked in low position. Call light within reach, will continue to monitor.

## 2024-02-13 NOTE — PROGRESS NOTES
Preprocedure completed, pt completed questionnaire. Consents verified. Pt adamantly refusing family contact after surgery by staff, States he will contact wife after.

## 2024-02-13 NOTE — OP NOTE
[address] [id number]                                OPERATIVE REPORT    PATIENT NAME: NAKUL SALDIVAR                   :        1950  MED REC NO:   [id number]                          ROOM:  ACCOUNT NO:   [id number]                           ADMIT DATE: 2024  PROVIDER:     Agus Montalvo MD    DATE OF PROCEDURE:  2024    PREOPERATIVE DIAGNOSES:  Severe osteomyelitis and gross infection of the  right foot with nonsalvageable foot.    POSTOPERATIVE DIAGNOSES:  Severe osteomyelitis and gross infection of  the right foot with nonsalvageable foot.    PROCEDURE PERFORMED:  Right lower extremity guillotine amputation above  the ankle.    SURGEON:  Agus Montalvo MD    ANESTHESIA:  General.    INDICATIONS:  The patient is a 73-year-old male who presented with  several-week history of pain, swelling and purulent drainage from the  right foot.  He underwent MRI showing extensive bony destruction and  infection throughout the right foot.  He had been taken 1 day prior to  the operating room by podiatry where I and D was performed and gross  purulence was drained from the foot.  The foot is felt to be  nonsalvageable due to extensive destruction and infection.  He is  brought to the operating room at this time to undergo guillotine  amputation.    PROCEDURE:  The patient was brought to the operating room, placed in the  supine position.  General endotracheal anesthesia induced.  After  adequate anesthesia, tourniquet was placed on the right distal thigh.   The right lower extremity was then prepped and draped in sterile  fashion.  Tourniquet was inflated to 250 mmHg.  A circular incision was  made through the skin and subcutaneous tissues just above the ankle  using cautery.  Using a reciprocating saw the muscles, tendons and bones  were cut.  Neurovascular bundles were then clamped and ligated using 2-0  Vicryl ties.  The

## 2024-02-13 NOTE — BRIEF OP NOTE
Brief Postoperative Note      Patient: Jackson Martínez  YOB: 1950  MRN: [id number]    Date of Procedure: 2/13/2024    Pre-Op Diagnosis Codes:     * Gangrene of right foot (HCC) [I96]    Post-Op Diagnosis: Same       Procedure(s):  RIGHT LOWER EXTREMITY GUILLOTINE AMPUTATION    Surgeon(s):  Agus Montalvo MD    Assistant:  Surgical Assistant: Lydia Díaz Jason    Anesthesia: General    Estimated Blood Loss (mL): Minimal    Complications: None    Specimens:   * No specimens in log *    Implants:  * No implants in log *      Drains: * No LDAs found *    Findings: Edema but no gross purulence or necrosis above ankle.      Electronically signed by Agus Montalvo MD on 2/13/2024 at 10:45 AM

## 2024-02-13 NOTE — PROGRESS NOTES
06:45 AM    ALT 20 02/10/2024 06:45 AM    AST 26 02/10/2024 06:45 AM    PROT 6.9 02/10/2024 06:45 AM    PROT 8.2 10/02/2011 12:05 AM    BILITOT 0.8 02/10/2024 06:45 AM    LABALBU 2.3 02/10/2024 06:45 AM       CRP   Date Value Ref Range Status   2024 98.0 (H) 0.0 - 5.1 mg/L Final     Comment:     WR-CRP Reference range:  30D-199Y    <5.1  <30D        Not established    CRP is used in the detection and evaluation of infection,  tissue injury, inflammatory disorders, and associated  disease.  Increases in CRP values are non-specific and  should not be interpreted without a complete clinical  evaluation.   reference ranges have not been  established and values should be interpreted within clinical  context and with serial measurements, if clinically  appropriate.     2024 139.5 (H) 0.0 - 5.1 mg/L Final     Comment:     WR-CRP Reference range:  30D-199Y    <5.1  <30D        Not established    CRP is used in the detection and evaluation of infection,  tissue injury, inflammatory disorders, and associated  disease.  Increases in CRP values are non-specific and  should not be interpreted without a complete clinical  evaluation.   reference ranges have not been  established and values should be interpreted within clinical  context and with serial measurements, if clinically  appropriate.     2024 189.6 (H) 0.0 - 5.1 mg/L Final     Comment:     WR-CRP Reference range:  30D-199Y    <5.1  <30D        Not established    CRP is used in the detection and evaluation of infection,  tissue injury, inflammatory disorders, and associated  disease.  Increases in CRP values are non-specific and  should not be interpreted without a complete clinical  evaluation.   reference ranges have not been  established and values should be interpreted within clinical  context and with serial measurements, if clinically  appropriate.     2024 288.8 (H) 0.0 - 5.1 mg/L Final     Comment:     WR-CRP  Reference range:  30D-199Y    <5.1  <30D        Not established    CRP is used in the detection and evaluation of infection,  tissue injury, inflammatory disorders, and associated  disease.  Increases in CRP values are non-specific and  should not be interpreted without a complete clinical  evaluation.   reference ranges have not been  established and values should be interpreted within clinical  context and with serial measurements, if clinically  appropriate.           MICRO:   BC x2 neg  2/10 Wound culture light growth MSSA; GS GPC    Surgical cultures in process      IMAGING:  XR R foot - suspicious for OM 3rd toe     MRI R foot 24  IMPRESSION:  Severe infectious change throughout the imaged portion of the right foot.   Multifocal osteomyelitis of the forefoot and midfoot, as discussed above.  This most severely involves the 3rd toe and MTP joint.   Suspected multifocal septic arthritis and osteomyelitis.   Severe diffuse myositis with suspected myonecrosis.   Phlegmonous change throughout the subcutaneous tissues of the forefoot.   Prompt surgical consultation is recommended.  Consider imaging of the nkle  and leg to evaluate the extent of disease.     ROBERTA    Summary    Normal bilateral lower extremity ankle brachial indices.   <50% stenosis in the right superficial femoral and popliteal arteries,   however stenosis may be underestimated due to calcific shadowing.   Monophasic popliteal and tibial flow noted which may be secondary to   hyperemic flow vs proximal stenosis.    Assessment:     Patient Active Problem List    Diagnosis Date Noted    Pyogenic inflammation of bone ([facility]) 2024    Sepsis ([facility]) 02/10/2024    Cellulitis of right foot 02/10/2024    Osteomyelitis of third toe of right foot ([facility]) 02/10/2024    Type 2 diabetes mellitus with right diabetic foot infection ([facility]) 02/10/2024    Neutrophilia 02/10/2024    Cellulitis and abscess of foot 02/10/2024    Smoking 02/10/2024

## 2024-02-13 NOTE — CONSULTS
Consult Placed     Who: CHRIS PARRA   Date: 02/13/24  Time:0842     Electronically signed by Juli Arboleda on 2/13/2024 at 8:42 AM

## 2024-02-14 LAB
ANION GAP SERPL CALCULATED.3IONS-SCNC: 8 MMOL/L (ref 3–16)
BACTERIA BLD CULT ORG #2: NORMAL
BACTERIA BLD CULT: NORMAL
BASOPHILS # BLD: 0 K/UL (ref 0–0.2)
BASOPHILS NFR BLD: 0.3 %
BUN SERPL-MCNC: 15 MG/DL (ref 7–20)
CALCIUM SERPL-MCNC: 7.5 MG/DL (ref 8.3–10.6)
CHLORIDE SERPL-SCNC: 105 MMOL/L (ref 99–110)
CO2 SERPL-SCNC: 24 MMOL/L (ref 21–32)
CREAT SERPL-MCNC: 0.6 MG/DL (ref 0.8–1.3)
CRP SERPL-MCNC: 97.6 MG/L (ref 0–5.1)
DEPRECATED RDW RBC AUTO: 14.2 % (ref 12.4–15.4)
EOSINOPHIL # BLD: 0.1 K/UL (ref 0–0.6)
EOSINOPHIL NFR BLD: 0.8 %
GFR SERPLBLD CREATININE-BSD FMLA CKD-EPI: >60 ML/MIN/{1.73_M2}
GLUCOSE BLD-MCNC: 148 MG/DL (ref 70–99)
GLUCOSE BLD-MCNC: 164 MG/DL (ref 70–99)
GLUCOSE BLD-MCNC: 190 MG/DL (ref 70–99)
GLUCOSE BLD-MCNC: 228 MG/DL (ref 70–99)
GLUCOSE SERPL-MCNC: 145 MG/DL (ref 70–99)
HCT VFR BLD AUTO: 22.9 % (ref 40.5–52.5)
HGB BLD-MCNC: 7.6 G/DL (ref 13.5–17.5)
LOEFFLER MB STN SPEC: NORMAL
LYMPHOCYTES # BLD: 1.6 K/UL (ref 1–5.1)
LYMPHOCYTES NFR BLD: 17 %
MCH RBC QN AUTO: 29.8 PG (ref 26–34)
MCHC RBC AUTO-ENTMCNC: 33.1 G/DL (ref 31–36)
MCV RBC AUTO: 90.1 FL (ref 80–100)
MONOCYTES # BLD: 0.8 K/UL (ref 0–1.3)
MONOCYTES NFR BLD: 8.8 %
NEUTROPHILS # BLD: 6.7 K/UL (ref 1.7–7.7)
NEUTROPHILS NFR BLD: 73.1 %
PERFORMED ON: ABNORMAL
PLATELET # BLD AUTO: 690 K/UL (ref 135–450)
PMV BLD AUTO: 6.6 FL (ref 5–10.5)
POTASSIUM SERPL-SCNC: 3.9 MMOL/L (ref 3.5–5.1)
RBC # BLD AUTO: 2.54 M/UL (ref 4.2–5.9)
SODIUM SERPL-SCNC: 137 MMOL/L (ref 136–145)
WBC # BLD AUTO: 9.1 K/UL (ref 4–11)

## 2024-02-14 PROCEDURE — 6360000002 HC RX W HCPCS: Performed by: PODIATRIST

## 2024-02-14 PROCEDURE — 2580000003 HC RX 258: Performed by: PODIATRIST

## 2024-02-14 PROCEDURE — 6370000000 HC RX 637 (ALT 250 FOR IP): Performed by: PODIATRIST

## 2024-02-14 PROCEDURE — 80048 BASIC METABOLIC PNL TOTAL CA: CPT

## 2024-02-14 PROCEDURE — 1200000000 HC SEMI PRIVATE

## 2024-02-14 PROCEDURE — 36415 COLL VENOUS BLD VENIPUNCTURE: CPT

## 2024-02-14 PROCEDURE — 6370000000 HC RX 637 (ALT 250 FOR IP): Performed by: NURSE PRACTITIONER

## 2024-02-14 PROCEDURE — 99024 POSTOP FOLLOW-UP VISIT: CPT | Performed by: CLINICAL NURSE SPECIALIST

## 2024-02-14 PROCEDURE — 86140 C-REACTIVE PROTEIN: CPT

## 2024-02-14 PROCEDURE — 85025 COMPLETE CBC W/AUTO DIFF WBC: CPT

## 2024-02-14 PROCEDURE — 94640 AIRWAY INHALATION TREATMENT: CPT

## 2024-02-14 RX ORDER — ALBUTEROL SULFATE 90 UG/1
2 AEROSOL, METERED RESPIRATORY (INHALATION) EVERY 6 HOURS PRN
Status: DISCONTINUED | OUTPATIENT
Start: 2024-02-14 | End: 2024-02-23 | Stop reason: HOSPADM

## 2024-02-14 RX ADMIN — FLUTICASONE PROPIONATE 1 SPRAY: 50 SPRAY, METERED NASAL at 08:44

## 2024-02-14 RX ADMIN — CEFEPIME 2000 MG: 2 INJECTION, POWDER, FOR SOLUTION INTRAVENOUS at 18:04

## 2024-02-14 RX ADMIN — METRONIDAZOLE 500 MG: 500 INJECTION, SOLUTION INTRAVENOUS at 18:04

## 2024-02-14 RX ADMIN — METRONIDAZOLE 500 MG: 500 INJECTION, SOLUTION INTRAVENOUS at 10:23

## 2024-02-14 RX ADMIN — Medication 2 PUFF: at 19:24

## 2024-02-14 RX ADMIN — Medication 2 PUFF: at 19:19

## 2024-02-14 RX ADMIN — OXYCODONE AND ACETAMINOPHEN 1 TABLET: 7.5; 325 TABLET ORAL at 05:09

## 2024-02-14 RX ADMIN — Medication 2 PUFF: at 08:29

## 2024-02-14 RX ADMIN — OXYCODONE AND ACETAMINOPHEN 1 TABLET: 7.5; 325 TABLET ORAL at 09:34

## 2024-02-14 RX ADMIN — MORPHINE SULFATE 4 MG: 4 INJECTION, SOLUTION INTRAMUSCULAR; INTRAVENOUS at 02:05

## 2024-02-14 RX ADMIN — OXYCODONE AND ACETAMINOPHEN 1 TABLET: 7.5; 325 TABLET ORAL at 22:02

## 2024-02-14 RX ADMIN — INSULIN LISPRO 1 UNITS: 100 INJECTION, SOLUTION INTRAVENOUS; SUBCUTANEOUS at 13:44

## 2024-02-14 RX ADMIN — CEFEPIME 2000 MG: 2 INJECTION, POWDER, FOR SOLUTION INTRAVENOUS at 10:24

## 2024-02-14 RX ADMIN — Medication 2 PUFF: at 08:28

## 2024-02-14 RX ADMIN — Medication 2 PUFF: at 01:07

## 2024-02-14 RX ADMIN — SODIUM CHLORIDE, PRESERVATIVE FREE 10 ML: 5 INJECTION INTRAVENOUS at 08:44

## 2024-02-14 RX ADMIN — METRONIDAZOLE 500 MG: 500 INJECTION, SOLUTION INTRAVENOUS at 02:05

## 2024-02-14 RX ADMIN — OXYCODONE AND ACETAMINOPHEN 1 TABLET: 7.5; 325 TABLET ORAL at 17:59

## 2024-02-14 RX ADMIN — OXYCODONE AND ACETAMINOPHEN 1 TABLET: 7.5; 325 TABLET ORAL at 13:43

## 2024-02-14 RX ADMIN — CEFEPIME 2000 MG: 2 INJECTION, POWDER, FOR SOLUTION INTRAVENOUS at 03:33

## 2024-02-14 ASSESSMENT — PAIN DESCRIPTION - ORIENTATION
ORIENTATION: RIGHT

## 2024-02-14 ASSESSMENT — PAIN DESCRIPTION - LOCATION
LOCATION: LEG
LOCATION: FOOT

## 2024-02-14 ASSESSMENT — PAIN SCALES - GENERAL
PAINLEVEL_OUTOF10: 5
PAINLEVEL_OUTOF10: 8
PAINLEVEL_OUTOF10: 8
PAINLEVEL_OUTOF10: 7

## 2024-02-14 ASSESSMENT — PAIN DESCRIPTION - DESCRIPTORS
DESCRIPTORS: ACHING;SHARP
DESCRIPTORS: ACHING;SHARP
DESCRIPTORS: ACHING

## 2024-02-14 NOTE — PROGRESS NOTES
Vascular Surgery Progress Note      POD#  1  S/P Right lower extremity Guillotine ampuation (2/13/2024)    Chief Complaint: Postop follow up      SUBJECTIVE:  states he didn't sleep well last night    OBJECTIVE    Physical  CURRENT VITALS:  /60   Pulse 67   Temp 98 °F (36.7 °C) (Oral)   Resp 17   Ht 1.753 m (5' 9\")   Wt 68 kg (150 lb)   SpO2 95%   BMI 22.15 kg/m²   24 HR INTAKE/OUTPUT:    Intake/Output Summary (Last 24 hours) at 2/14/2024 0811  Last data filed at 2/14/2024 0333  Gross per 24 hour   Intake 1110 ml   Output 453 ml   Net 657 ml     UO:  NR-NR-450  ml /shift     RLE with ace wrap intact with old drainage      Data  Lab Results   Component Value Date    WBC 9.1 02/14/2024    HGB 7.6 (L) 02/14/2024    HCT 22.9 (L) 02/14/2024    MCV 90.1 02/14/2024     (H) 02/14/2024     Lab Results   Component Value Date/Time     02/14/2024 08:21 AM    K 3.9 02/14/2024 08:21 AM     02/14/2024 08:21 AM    CO2 24 02/14/2024 08:21 AM    BUN 15 02/14/2024 08:21 AM    CREATININE 0.6 02/14/2024 08:21 AM    GLUCOSE 145 02/14/2024 08:21 AM    CALCIUM 7.5 02/14/2024 08:21 AM    LABGLOM >60 02/14/2024 08:21 AM      Lab Results   Component Value Date    CRP 97.6 (H) 02/14/2024       Current Inpatient Medications  Current Facility-Administered Medications: albuterol sulfate HFA (PROVENTIL;VENTOLIN;PROAIR) 108 (90 Base) MCG/ACT inhaler 2 puff, 2 puff, Inhalation, Q6H PRN  ipratropium 0.5 mg-albuterol 2.5 mg (DUONEB) nebulizer solution 1 Dose, 1 Dose, Inhalation, Q4H PRN  oxyCODONE-acetaminophen (PERCOCET) 7.5-325 MG per tablet 1 tablet, 1 tablet, Oral, Q4H PRN  morphine sulfate (PF) injection 4 mg, 4 mg, IntraVENous, Q4H PRN  sodium chloride flush 0.9 % injection 5-40 mL, 5-40 mL, IntraVENous, 2 times per day  sodium chloride flush 0.9 % injection 5-40 mL, 5-40 mL, IntraVENous, PRN  0.9 % sodium chloride infusion, , IntraVENous, PRN  ondansetron (ZOFRAN-ODT) disintegrating tablet 4 mg, 4 mg, Oral, Q8H

## 2024-02-14 NOTE — PROGRESS NOTES
Hospital Medicine Progress Note      Date of Admission: 2/9/2024  Hospital Day: 6      Chief Admission Complaint:  rt foot pain     Subjective:  resting in bed, notes some ongoing pain issues, no overnight events     Presenting Admission History:          Jackson Martínez is a 73 y.o. male with pmh of tobacco abuse who presents with ongoing right foot pain with wound and drainage, found to be in severe sepsis.      In the ER, patient was hypertensive on arrival, tachycardic 110s. XR right foot on arrival showing bony resorption of 3rd toe suspicious for osteomyelitis with soft tissue swelling and neg soft tissue gas. Labwork significant for Lactate 2.8, .8, , leukocytosis of 29.4, Hgb of 9.2. Received sepsis bolus, IV Vanc and Zosyn x 1. Patient admitted to the floor for continuous monitoring, IV meds and fluids, specialty evaluation.      Patient states his symptoms started 2 weeks ago when he cut his right third toenail with a clipper and developed a wound. Has had progressively worsening pain, drainage, bleeding, and difficulty bearing weight on his right foot due to the pain.  Did not seek care at an urgent care.  Has only taken Tylenol or Advil at home as needed for pain.  He denies prior history of similar wounds, diabetes, neuropathy, history of hypertension or hyperlipidemia. Mentions he had a \"pain pill problem\" years ago.  He denies recent fever, chills, chest pain, SOB, abdominal pain, nausea or vomiting, diarrhea constipation, hematuria or dysuria, bloody stools, headaches, dizziness, acute vision or hearing changes, LOC, numbness or tingling.        Assessment/Plan:       Current Principal Problem:  Sepsis (HCC)       Sepsis secondary osteomyelitis  X-ray of foot was consistent with osteomyelitis.  Podiatry and ID were consulted, apprec recs and mgmt  Follow-up MRI for surgical planning  I IV antibiotics: Vancomycin plus cefepime plus Flagyl  Follow-up culture results  Pain control: Oral

## 2024-02-14 NOTE — CARE COORDINATION
Chart reviewed day 4. Care provided by podiatry, vascular, ID and IM. Pt is from home with his wife. He was IPTA. Pt had R foot amp yesterday and will have a likely have R BKA Monday with IPOP. Will continue to follow course for needs. Katharine Ba RN

## 2024-02-14 NOTE — ANESTHESIA POSTPROCEDURE EVALUATION
Department of Anesthesiology  Postprocedure Note    Patient: Jackson Martínez  MRN: [id number]  YOB: 1950  Date of evaluation: 2/14/2024    Procedure Summary       Date: 02/12/24 Room / Location: [address]    Anesthesia Start: 2218 Anesthesia Stop: 2310    Procedure: RIGHT FOOT DEBRIDEMENT INCISION AND DRAINAGE OF MULTPILE SITES WITH RIGHT THIRD TOE AMPUTATION (Right) Diagnosis:       Osteomyelitis of third toe of right foot (HCC)      (Osteomyelitis of third toe of right foot (HCC) [M86.9])    Surgeons: Florinda Slater DPM Responsible Provider: Scar Cristobal MD    Anesthesia Type: general ASA Status: 3 - Emergent            Anesthesia Type: No value filed.    Enedina Phase I: Enedina Score: 10    Enedina Phase II:      Anesthesia Post Evaluation    Patient location during evaluation: PACU  Level of consciousness: awake  Nausea & Vomiting: no vomiting  Cardiovascular status: blood pressure returned to baseline  Respiratory status: acceptable  Hydration status: stable  Pain management: adequate    No notable events documented.

## 2024-02-14 NOTE — PROGRESS NOTES
Pt a/o. VSS. Shift assessment updated and documented. Vitals checked and stable , scheduled meds given , assisted with reposition ,  using urinal in bed and bedpan , pain needs addressed. Dressing intact. Requested for Albuterol last night  and order was prescribed.

## 2024-02-15 LAB
ANION GAP SERPL CALCULATED.3IONS-SCNC: 7 MMOL/L (ref 3–16)
BACTERIA SPEC AEROBE CULT: ABNORMAL
BACTERIA SPEC AEROBE CULT: ABNORMAL
BACTERIA SPEC ANAEROBE CULT: ABNORMAL
BASOPHILS # BLD: 0.1 K/UL (ref 0–0.2)
BASOPHILS NFR BLD: 0.6 %
BUN SERPL-MCNC: 12 MG/DL (ref 7–20)
CALCIUM SERPL-MCNC: 7.8 MG/DL (ref 8.3–10.6)
CHLORIDE SERPL-SCNC: 102 MMOL/L (ref 99–110)
CO2 SERPL-SCNC: 24 MMOL/L (ref 21–32)
CREAT SERPL-MCNC: <0.5 MG/DL (ref 0.8–1.3)
CRP SERPL-MCNC: 49.3 MG/L (ref 0–5.1)
DEPRECATED RDW RBC AUTO: 14.3 % (ref 12.4–15.4)
EOSINOPHIL # BLD: 0.1 K/UL (ref 0–0.6)
EOSINOPHIL NFR BLD: 0.7 %
GFR SERPLBLD CREATININE-BSD FMLA CKD-EPI: >60 ML/MIN/{1.73_M2}
GLUCOSE BLD-MCNC: 120 MG/DL (ref 70–99)
GLUCOSE BLD-MCNC: 134 MG/DL (ref 70–99)
GLUCOSE BLD-MCNC: 135 MG/DL (ref 70–99)
GLUCOSE BLD-MCNC: 161 MG/DL (ref 70–99)
GLUCOSE SERPL-MCNC: 118 MG/DL (ref 70–99)
GRAM STN SPEC: ABNORMAL
HCT VFR BLD AUTO: 23.6 % (ref 40.5–52.5)
HGB BLD-MCNC: 7.8 G/DL (ref 13.5–17.5)
LYMPHOCYTES # BLD: 1.5 K/UL (ref 1–5.1)
LYMPHOCYTES NFR BLD: 15.4 %
MCH RBC QN AUTO: 29.8 PG (ref 26–34)
MCHC RBC AUTO-ENTMCNC: 33.2 G/DL (ref 31–36)
MCV RBC AUTO: 89.6 FL (ref 80–100)
MONOCYTES # BLD: 0.7 K/UL (ref 0–1.3)
MONOCYTES NFR BLD: 7.4 %
NEUTROPHILS # BLD: 7.3 K/UL (ref 1.7–7.7)
NEUTROPHILS NFR BLD: 75.9 %
ORGANISM: ABNORMAL
PERFORMED ON: ABNORMAL
PLATELET # BLD AUTO: 735 K/UL (ref 135–450)
PMV BLD AUTO: 6.6 FL (ref 5–10.5)
POTASSIUM SERPL-SCNC: 4.1 MMOL/L (ref 3.5–5.1)
RBC # BLD AUTO: 2.63 M/UL (ref 4.2–5.9)
SODIUM SERPL-SCNC: 133 MMOL/L (ref 136–145)
WBC # BLD AUTO: 9.7 K/UL (ref 4–11)

## 2024-02-15 PROCEDURE — 86140 C-REACTIVE PROTEIN: CPT

## 2024-02-15 PROCEDURE — 1200000000 HC SEMI PRIVATE

## 2024-02-15 PROCEDURE — 6370000000 HC RX 637 (ALT 250 FOR IP): Performed by: PODIATRIST

## 2024-02-15 PROCEDURE — APPNB15 APP NON BILLABLE TIME 0-15 MINS: Performed by: CLINICAL NURSE SPECIALIST

## 2024-02-15 PROCEDURE — 2580000003 HC RX 258: Performed by: PODIATRIST

## 2024-02-15 PROCEDURE — 99232 SBSQ HOSP IP/OBS MODERATE 35: CPT | Performed by: INTERNAL MEDICINE

## 2024-02-15 PROCEDURE — 85025 COMPLETE CBC W/AUTO DIFF WBC: CPT

## 2024-02-15 PROCEDURE — 6360000002 HC RX W HCPCS: Performed by: PODIATRIST

## 2024-02-15 PROCEDURE — 94640 AIRWAY INHALATION TREATMENT: CPT

## 2024-02-15 PROCEDURE — 80048 BASIC METABOLIC PNL TOTAL CA: CPT

## 2024-02-15 RX ADMIN — Medication 2 PUFF: at 07:58

## 2024-02-15 RX ADMIN — SODIUM CHLORIDE, PRESERVATIVE FREE 10 ML: 5 INJECTION INTRAVENOUS at 19:49

## 2024-02-15 RX ADMIN — METRONIDAZOLE 500 MG: 500 INJECTION, SOLUTION INTRAVENOUS at 09:11

## 2024-02-15 RX ADMIN — OXYCODONE AND ACETAMINOPHEN 1 TABLET: 7.5; 325 TABLET ORAL at 23:17

## 2024-02-15 RX ADMIN — CEFEPIME 2000 MG: 2 INJECTION, POWDER, FOR SOLUTION INTRAVENOUS at 02:07

## 2024-02-15 RX ADMIN — METRONIDAZOLE 500 MG: 500 INJECTION, SOLUTION INTRAVENOUS at 02:07

## 2024-02-15 RX ADMIN — MORPHINE SULFATE 4 MG: 4 INJECTION, SOLUTION INTRAMUSCULAR; INTRAVENOUS at 21:35

## 2024-02-15 RX ADMIN — SODIUM CHLORIDE, PRESERVATIVE FREE 10 ML: 5 INJECTION INTRAVENOUS at 08:57

## 2024-02-15 RX ADMIN — OXYCODONE AND ACETAMINOPHEN 1 TABLET: 7.5; 325 TABLET ORAL at 15:29

## 2024-02-15 RX ADMIN — MORPHINE SULFATE 4 MG: 4 INJECTION, SOLUTION INTRAMUSCULAR; INTRAVENOUS at 17:34

## 2024-02-15 RX ADMIN — Medication 2 PUFF: at 20:21

## 2024-02-15 RX ADMIN — OXYCODONE AND ACETAMINOPHEN 1 TABLET: 7.5; 325 TABLET ORAL at 06:07

## 2024-02-15 RX ADMIN — MORPHINE SULFATE 4 MG: 4 INJECTION, SOLUTION INTRAMUSCULAR; INTRAVENOUS at 11:10

## 2024-02-15 RX ADMIN — OXYCODONE AND ACETAMINOPHEN 1 TABLET: 7.5; 325 TABLET ORAL at 02:07

## 2024-02-15 RX ADMIN — OXYCODONE AND ACETAMINOPHEN 1 TABLET: 7.5; 325 TABLET ORAL at 10:10

## 2024-02-15 ASSESSMENT — PAIN DESCRIPTION - LOCATION
LOCATION: FOOT
LOCATION: LEG
LOCATION: FOOT
LOCATION: LEG

## 2024-02-15 ASSESSMENT — PAIN SCALES - GENERAL
PAINLEVEL_OUTOF10: 8
PAINLEVEL_OUTOF10: 9
PAINLEVEL_OUTOF10: 8
PAINLEVEL_OUTOF10: 8
PAINLEVEL_OUTOF10: 7
PAINLEVEL_OUTOF10: 9
PAINLEVEL_OUTOF10: 0
PAINLEVEL_OUTOF10: 8
PAINLEVEL_OUTOF10: 9
PAINLEVEL_OUTOF10: 7
PAINLEVEL_OUTOF10: 0

## 2024-02-15 ASSESSMENT — PAIN DESCRIPTION - ORIENTATION
ORIENTATION: RIGHT

## 2024-02-15 ASSESSMENT — PAIN DESCRIPTION - DESCRIPTORS
DESCRIPTORS: ACHING
DESCRIPTORS: ACHING
DESCRIPTORS: STABBING
DESCRIPTORS: STABBING
DESCRIPTORS: ACHING
DESCRIPTORS: ACHING

## 2024-02-15 ASSESSMENT — PAIN DESCRIPTION - PAIN TYPE: TYPE: ACUTE PAIN

## 2024-02-15 ASSESSMENT — PAIN - FUNCTIONAL ASSESSMENT
PAIN_FUNCTIONAL_ASSESSMENT: ACTIVITIES ARE NOT PREVENTED
PAIN_FUNCTIONAL_ASSESSMENT: ACTIVITIES ARE NOT PREVENTED

## 2024-02-15 NOTE — PROGRESS NOTES
Infectious Disease Follow up Notes    CC :  infection R foot      Antibiotics:  Cefepime 2g q8  Flagyl 500 IV q8    Admit Date:   2/9/2024  Hospital Day: 7    Subjective:   He remains AF on RA  Pain severe after dressing change.    No other acute concerns     Objective:     Patient Vitals for the past 8 hrs:   BP Temp Temp src Pulse Resp SpO2   02/15/24 0852 129/79 98.4 °F (36.9 °C) Oral 86 18 95 %   02/15/24 0800 -- -- -- 67 18 95 %   02/15/24 0637 -- -- -- -- 18 --   02/15/24 0607 -- -- -- -- 18 --   02/15/24 0237 -- -- -- -- 16 --   02/15/24 0207 -- -- -- -- 18 --         EXAM:  Alert, conversant, NAD   Exposed skin without rash  R LE dressed        LINE:   PIV in place       Scheduled Meds:   sodium chloride flush  5-40 mL IntraVENous 2 times per day    nicotine  1 patch TransDERmal Daily    fluticasone  1 spray Nasal Daily    mometasone-formoterol  2 puff Inhalation BID RT    insulin lispro  0-4 Units SubCUTAneous TID WC    insulin lispro  0-4 Units SubCUTAneous Nightly       Continuous Infusions:   sodium chloride 20 mL/hr at 02/13/24 1004    dextrose            Data Review:    Lab Results   Component Value Date    WBC 9.7 02/15/2024    HGB 7.8 (L) 02/15/2024    HCT 23.6 (L) 02/15/2024    MCV 89.6 02/15/2024     (H) 02/15/2024     Lab Results   Component Value Date    CREATININE <0.5 (L) 02/15/2024    BUN 12 02/15/2024     (L) 02/15/2024    K 4.1 02/15/2024     02/15/2024    CO2 24 02/15/2024       Hepatic Function Panel:   Lab Results   Component Value Date/Time    ALKPHOS 163 02/10/2024 06:45 AM    ALT 20 02/10/2024 06:45 AM    AST 26 02/10/2024 06:45 AM    PROT 6.9 02/10/2024 06:45 AM    PROT 8.2 10/02/2011 12:05 AM    BILITOT 0.8 02/10/2024 06:45 AM    LABALBU 2.3 02/10/2024 06:45 AM       CRP   Date Value Ref Range Status   02/15/2024 49.3 (H) 0.0 - 5.1 mg/L Final     Comment:     WR-CRP Reference

## 2024-02-15 NOTE — PLAN OF CARE
Problem: Discharge Planning  Goal: Discharge to home or other facility with appropriate resources  Outcome: Progressing  Flowsheets (Taken 2/15/2024 1241)  Discharge to home or other facility with appropriate resources:   Identify discharge learning needs (meds, wound care, etc)   Arrange for needed discharge resources and transportation as appropriate   Identify barriers to discharge with patient and caregiver     Problem: Pain  Goal: Verbalizes/displays adequate comfort level or baseline comfort level  Outcome: Progressing     Problem: Skin/Tissue Integrity  Goal: Absence of new skin breakdown  Description: 1.  Monitor for areas of redness and/or skin breakdown  2.  Assess vascular access sites hourly  3.  Every 4-6 hours minimum:  Change oxygen saturation probe site  4.  Every 4-6 hours:  If on nasal continuous positive airway pressure, respiratory therapy assess nares and determine need for appliance change or resting period.  Outcome: Progressing     Problem: Safety - Adult  Goal: Free from fall injury  Outcome: Progressing     Problem: ABCDS Injury Assessment  Goal: Absence of physical injury  Outcome: Progressing     Problem: Skin/Tissue Integrity - Adult  Goal: Skin integrity remains intact  Outcome: Progressing  Flowsheets (Taken 2/15/2024 1241)  Skin Integrity Remains Intact:   Assess vascular access sites hourly   Monitor for areas of redness and/or skin breakdown  Goal: Incisions, wounds, or drain sites healing without S/S of infection  Outcome: Progressing  Flowsheets (Taken 2/15/2024 1248)  Incisions, Wounds, or Drain Sites Healing Without Sign and Symptoms of Infection: ADMISSION and DAILY: Assess and document risk factors for pressure ulcer development     Problem: Musculoskeletal - Adult  Goal: Return mobility to safest level of function  Outcome: Progressing  Flowsheets (Taken 2/15/2024 1246)  Return Mobility to Safest Level of Function:   Assess patient stability and activity tolerance for  and Co-morbidities  Goal: Patient's chronic conditions and co-morbidity symptoms are monitored and maintained or improved  Outcome: Progressing  Flowsheets (Taken 2/15/2024 1249)  Care Plan - Patient's Chronic Conditions and Co-Morbidity Symptoms are Monitored and Maintained or Improved:   Collaborate with multidisciplinary team to address chronic and comorbid conditions and prevent exacerbation or deterioration   Monitor and assess patient's chronic conditions and comorbid symptoms for stability, deterioration, or improvement   Update acute care plan with appropriate goals if chronic or comorbid symptoms are exacerbated and prevent overall improvement and discharge

## 2024-02-15 NOTE — CARE COORDINATION
Chart reviewed day 5. Care provided by podiatry, vascular, ID and IM. Pt is from home with his wife . He was IPTA. Pt had R foot amp Tuesday. Pt will have R BKA Monday with prosthetic. Pt is on IVABX, Na 133, H&H 7.8 and 23.6. Will continue to follow course for needs. Katharine Ba RN

## 2024-02-15 NOTE — PROGRESS NOTES
**OR** ondansetron, polyethylene glycol, acetaminophen **OR** acetaminophen, glucose, dextrose bolus **OR** dextrose bolus, glucagon (rDNA), dextrose     Labs:  Personally reviewed and interpreted for clinical significance.     Recent Labs     02/13/24 0751 02/14/24  0821 02/15/24  0625   WBC 24.2* 9.1 9.7   HGB 7.1* 7.6* 7.8*   HCT 22.2* 22.9* 23.6*   * 690* 735*     Recent Labs     02/13/24  0751 02/14/24  0821 02/15/24  0625    137 133*   K 3.8 3.9 4.1    105 102   CO2 24 24 24   BUN 14 15 12   CREATININE 0.6* 0.6* <0.5*   CALCIUM 7.8* 7.5* 7.8*     No results for input(s): \"PROBNP\", \"TROPHS\" in the last 72 hours.  No results for input(s): \"LABA1C\" in the last 72 hours.  No results for input(s): \"AST\", \"ALT\", \"BILIDIR\", \"BILITOT\", \"ALKPHOS\" in the last 72 hours.  No results for input(s): \"INR\", \"LACTA\", \"TSH\" in the last 72 hours.    Urine Cultures: No results found for: \"LABURIN\"  Blood Cultures:   Lab Results   Component Value Date/Time    BC No Growth after 4 days of incubation. 02/09/2024 11:35 PM     Lab Results   Component Value Date/Time    BLOODCULT2 No Growth after 4 days of incubation. 02/09/2024 11:40 PM     Organism:   Lab Results   Component Value Date/Time    ORG Staphylococcus aureus 02/12/2024 10:37 PM         Kodi Tello MD

## 2024-02-15 NOTE — PROGRESS NOTES
Vascular Surgery Progress Note      POD#  2  S/P Right lower extremity Guillotine ampuation (2/13/2024)    Chief Complaint: Postop follow up      SUBJECTIVE:  States he wants to go home for a day    OBJECTIVE    Physical  CURRENT VITALS:  /79   Pulse 86   Temp 98.4 °F (36.9 °C) (Oral)   Resp 18   Ht 1.753 m (5' 9\")   Wt 68 kg (150 lb)   SpO2 95%   BMI 22.15 kg/m²   24 HR INTAKE/OUTPUT:    Intake/Output Summary (Last 24 hours) at 2/15/2024 1019  Last data filed at 2/15/2024 0851  Gross per 24 hour   Intake 120 ml   Output 150 ml   Net -30 ml       RLE with ace wrap intact with old drainage  Dressing changed,  No edema. Serosanguineous drainage as expected  Dressing re-wrapped      Data  Lab Results   Component Value Date    WBC 9.7 02/15/2024    HGB 7.8 (L) 02/15/2024    HCT 23.6 (L) 02/15/2024    MCV 89.6 02/15/2024     (H) 02/15/2024     Lab Results   Component Value Date/Time     02/15/2024 06:25 AM    K 4.1 02/15/2024 06:25 AM     02/15/2024 06:25 AM    CO2 24 02/15/2024 06:25 AM    BUN 12 02/15/2024 06:25 AM    CREATININE <0.5 02/15/2024 06:25 AM    GLUCOSE 118 02/15/2024 06:25 AM    CALCIUM 7.8 02/15/2024 06:25 AM    LABGLOM >60 02/15/2024 06:25 AM      Lab Results   Component Value Date    CRP 49.3 (H) 02/15/2024       Current Inpatient Medications  Current Facility-Administered Medications: albuterol sulfate HFA (PROVENTIL;VENTOLIN;PROAIR) 108 (90 Base) MCG/ACT inhaler 2 puff, 2 puff, Inhalation, Q6H PRN  ipratropium 0.5 mg-albuterol 2.5 mg (DUONEB) nebulizer solution 1 Dose, 1 Dose, Inhalation, Q4H PRN  oxyCODONE-acetaminophen (PERCOCET) 7.5-325 MG per tablet 1 tablet, 1 tablet, Oral, Q4H PRN  morphine sulfate (PF) injection 4 mg, 4 mg, IntraVENous, Q4H PRN  sodium chloride flush 0.9 % injection 5-40 mL, 5-40 mL, IntraVENous, 2 times per day  sodium chloride flush 0.9 % injection 5-40 mL, 5-40 mL, IntraVENous, PRN  0.9 % sodium chloride infusion, , IntraVENous,

## 2024-02-16 ENCOUNTER — ANESTHESIA EVENT (OUTPATIENT)
Dept: OPERATING ROOM | Age: 74
DRG: 854 | End: 2024-02-16
Payer: MEDICARE

## 2024-02-16 LAB
ANION GAP SERPL CALCULATED.3IONS-SCNC: 8 MMOL/L (ref 3–16)
BASOPHILS # BLD: 0 K/UL (ref 0–0.2)
BASOPHILS NFR BLD: 0.6 %
BUN SERPL-MCNC: 10 MG/DL (ref 7–20)
CALCIUM SERPL-MCNC: 8 MG/DL (ref 8.3–10.6)
CHLORIDE SERPL-SCNC: 101 MMOL/L (ref 99–110)
CO2 SERPL-SCNC: 23 MMOL/L (ref 21–32)
CREAT SERPL-MCNC: <0.5 MG/DL (ref 0.8–1.3)
CRP SERPL-MCNC: 26.3 MG/L (ref 0–5.1)
DEPRECATED RDW RBC AUTO: 14.5 % (ref 12.4–15.4)
EOSINOPHIL # BLD: 0.1 K/UL (ref 0–0.6)
EOSINOPHIL NFR BLD: 1.2 %
GFR SERPLBLD CREATININE-BSD FMLA CKD-EPI: >60 ML/MIN/{1.73_M2}
GLUCOSE BLD-MCNC: 136 MG/DL (ref 70–99)
GLUCOSE BLD-MCNC: 171 MG/DL (ref 70–99)
GLUCOSE BLD-MCNC: 186 MG/DL (ref 70–99)
GLUCOSE BLD-MCNC: 202 MG/DL (ref 70–99)
GLUCOSE SERPL-MCNC: 99 MG/DL (ref 70–99)
HCT VFR BLD AUTO: 28 % (ref 40.5–52.5)
HGB BLD-MCNC: 9.3 G/DL (ref 13.5–17.5)
LYMPHOCYTES # BLD: 1.6 K/UL (ref 1–5.1)
LYMPHOCYTES NFR BLD: 19.4 %
MCH RBC QN AUTO: 30.5 PG (ref 26–34)
MCHC RBC AUTO-ENTMCNC: 33.3 G/DL (ref 31–36)
MCV RBC AUTO: 91.4 FL (ref 80–100)
MONOCYTES # BLD: 0.9 K/UL (ref 0–1.3)
MONOCYTES NFR BLD: 10.6 %
NEUTROPHILS # BLD: 5.5 K/UL (ref 1.7–7.7)
NEUTROPHILS NFR BLD: 68.2 %
PERFORMED ON: ABNORMAL
PLATELET # BLD AUTO: 834 K/UL (ref 135–450)
PMV BLD AUTO: 6.5 FL (ref 5–10.5)
POTASSIUM SERPL-SCNC: 4.1 MMOL/L (ref 3.5–5.1)
RBC # BLD AUTO: 3.06 M/UL (ref 4.2–5.9)
SODIUM SERPL-SCNC: 132 MMOL/L (ref 136–145)
WBC # BLD AUTO: 8.1 K/UL (ref 4–11)

## 2024-02-16 PROCEDURE — 2580000003 HC RX 258: Performed by: PODIATRIST

## 2024-02-16 PROCEDURE — 97166 OT EVAL MOD COMPLEX 45 MIN: CPT

## 2024-02-16 PROCEDURE — APPNB15 APP NON BILLABLE TIME 0-15 MINS: Performed by: CLINICAL NURSE SPECIALIST

## 2024-02-16 PROCEDURE — 80048 BASIC METABOLIC PNL TOTAL CA: CPT

## 2024-02-16 PROCEDURE — 36415 COLL VENOUS BLD VENIPUNCTURE: CPT

## 2024-02-16 PROCEDURE — 97116 GAIT TRAINING THERAPY: CPT

## 2024-02-16 PROCEDURE — 6370000000 HC RX 637 (ALT 250 FOR IP): Performed by: PODIATRIST

## 2024-02-16 PROCEDURE — 86140 C-REACTIVE PROTEIN: CPT

## 2024-02-16 PROCEDURE — 97530 THERAPEUTIC ACTIVITIES: CPT

## 2024-02-16 PROCEDURE — 6360000002 HC RX W HCPCS: Performed by: PODIATRIST

## 2024-02-16 PROCEDURE — 85025 COMPLETE CBC W/AUTO DIFF WBC: CPT

## 2024-02-16 PROCEDURE — 94640 AIRWAY INHALATION TREATMENT: CPT

## 2024-02-16 PROCEDURE — 97535 SELF CARE MNGMENT TRAINING: CPT

## 2024-02-16 PROCEDURE — 97162 PT EVAL MOD COMPLEX 30 MIN: CPT

## 2024-02-16 PROCEDURE — 1200000000 HC SEMI PRIVATE

## 2024-02-16 RX ADMIN — INSULIN LISPRO 1 UNITS: 100 INJECTION, SOLUTION INTRAVENOUS; SUBCUTANEOUS at 11:58

## 2024-02-16 RX ADMIN — OXYCODONE AND ACETAMINOPHEN 1 TABLET: 7.5; 325 TABLET ORAL at 23:52

## 2024-02-16 RX ADMIN — MORPHINE SULFATE 4 MG: 4 INJECTION, SOLUTION INTRAMUSCULAR; INTRAVENOUS at 16:12

## 2024-02-16 RX ADMIN — OXYCODONE AND ACETAMINOPHEN 1 TABLET: 7.5; 325 TABLET ORAL at 14:43

## 2024-02-16 RX ADMIN — OXYCODONE AND ACETAMINOPHEN 1 TABLET: 7.5; 325 TABLET ORAL at 19:54

## 2024-02-16 RX ADMIN — SODIUM CHLORIDE, PRESERVATIVE FREE 10 ML: 5 INJECTION INTRAVENOUS at 20:29

## 2024-02-16 RX ADMIN — MORPHINE SULFATE 4 MG: 4 INJECTION, SOLUTION INTRAMUSCULAR; INTRAVENOUS at 06:05

## 2024-02-16 RX ADMIN — OXYCODONE AND ACETAMINOPHEN 1 TABLET: 7.5; 325 TABLET ORAL at 08:37

## 2024-02-16 RX ADMIN — OXYCODONE AND ACETAMINOPHEN 1 TABLET: 7.5; 325 TABLET ORAL at 03:52

## 2024-02-16 RX ADMIN — FLUTICASONE PROPIONATE 1 SPRAY: 50 SPRAY, METERED NASAL at 08:37

## 2024-02-16 RX ADMIN — MORPHINE SULFATE 4 MG: 4 INJECTION, SOLUTION INTRAMUSCULAR; INTRAVENOUS at 01:47

## 2024-02-16 RX ADMIN — MORPHINE SULFATE 4 MG: 4 INJECTION, SOLUTION INTRAMUSCULAR; INTRAVENOUS at 21:57

## 2024-02-16 RX ADMIN — SODIUM CHLORIDE, PRESERVATIVE FREE 10 ML: 5 INJECTION INTRAVENOUS at 08:37

## 2024-02-16 RX ADMIN — Medication 2 PUFF: at 09:09

## 2024-02-16 RX ADMIN — MORPHINE SULFATE 4 MG: 4 INJECTION, SOLUTION INTRAMUSCULAR; INTRAVENOUS at 11:47

## 2024-02-16 RX ADMIN — Medication 2 PUFF: at 20:17

## 2024-02-16 ASSESSMENT — PAIN SCALES - GENERAL
PAINLEVEL_OUTOF10: 0
PAINLEVEL_OUTOF10: 8
PAINLEVEL_OUTOF10: 9
PAINLEVEL_OUTOF10: 8
PAINLEVEL_OUTOF10: 9
PAINLEVEL_OUTOF10: 8
PAINLEVEL_OUTOF10: 10
PAINLEVEL_OUTOF10: 8
PAINLEVEL_OUTOF10: 10
PAINLEVEL_OUTOF10: 8
PAINLEVEL_OUTOF10: 7
PAINLEVEL_OUTOF10: 8

## 2024-02-16 ASSESSMENT — PAIN DESCRIPTION - LOCATION: LOCATION: FOOT

## 2024-02-16 ASSESSMENT — PAIN DESCRIPTION - DESCRIPTORS: DESCRIPTORS: STABBING

## 2024-02-16 NOTE — PLAN OF CARE
Problem: Discharge Planning  Goal: Discharge to home or other facility with appropriate resources  Outcome: Progressing     Problem: Pain  Goal: Verbalizes/displays adequate comfort level or baseline comfort level  Outcome: Progressing  Flowsheets  Taken 2/16/2024 0351 by Shawna Parks RN  Verbalizes/displays adequate comfort level or baseline comfort level: Encourage patient to monitor pain and request assistance  Taken 2/16/2024 0345 by Shawna Parks RN  Verbalizes/displays adequate comfort level or baseline comfort level: Encourage patient to monitor pain and request assistance  Taken 2/15/2024 2317 by Shawna Parks RN  Verbalizes/displays adequate comfort level or baseline comfort level: Encourage patient to monitor pain and request assistance     Problem: Skin/Tissue Integrity  Goal: Absence of new skin breakdown  Description: 1.  Monitor for areas of redness and/or skin breakdown  2.  Assess vascular access sites hourly  3.  Every 4-6 hours minimum:  Change oxygen saturation probe site  4.  Every 4-6 hours:  If on nasal continuous positive airway pressure, respiratory therapy assess nares and determine need for appliance change or resting period.  Outcome: Progressing     Problem: Metabolic/Fluid and Electrolytes - Adult  Goal: Glucose maintained within prescribed range  Outcome: Progressing     Problem: Chronic Conditions and Co-morbidities  Goal: Patient's chronic conditions and co-morbidity symptoms are monitored and maintained or improved  Outcome: Progressing

## 2024-02-16 NOTE — PROGRESS NOTES
Comprehensive Nutrition Assessment    Type and Reason for Visit:  Initial, RD Nutrition Re-Screen/LOS    Nutrition Recommendations/Plan:   Modify diet to CC5 diet and encourage PO intake   RD to add glucerna once daily   RD to order new updated weight  Monitor nutrition adequacy, pertinent labs, bowel habits, wt changes, and clinical progress     Malnutrition Assessment:  Malnutrition Status:  At risk for malnutrition (Comment) (02/16/24 1040)    Context:  Acute Illness     Findings of the 6 clinical characteristics of malnutrition:  Energy Intake:  Mild decrease in energy intake (Comment)    Nutrition Assessment:    LOS assessment: 73 y.o. m admitted w/ right foot pain with wound and drainage, found to be in severe sepsis. S/p RLE guillotine amputation on 2/13. Plan for right BKA with IPOP on Monday. Pt in precautions. On CC4 diet, will modify to CC5. PO % of meals, mostly % in EMR. DEBRA wt changes d/t stated weight, RD to order new updated weight. RD to add glucerna once daily to promote protein intake. Will add DM diet education to AVS. Continue to encourage PO intake, will continue to monitor.    Nutrition Related Findings:    Na 132. BG  x 24 hours. No BM. Hemoglobin A1C 7.4 on 2/10 Wound Type: Diabetic Ulcer, Surgical Incision       Current Nutrition Intake & Therapies:    Average Meal Intake: %, 26-50%  Average Supplements Intake: None Ordered  Diet NPO  ADULT DIET; Regular; 5 carb choices (75 gm/meal)  ADULT ORAL NUTRITION SUPPLEMENT; Lunch; Diabetic Oral Supplement    Anthropometric Measures:  Height: 175.3 cm (5' 9\")  Ideal Body Weight (IBW): 160 lbs (73 kg)       Current Body Weight: 68 kg (150 lb), 93.8 % IBW. Weight Source: Stated  Current BMI (kg/m2): 22.1        Weight Adjustment For: Amputation  Total Adjusted Percentage (Calculated): 1.5  Adjusted Ideal Body Weight (lbs) (Calculated): 157.6 lbs  Adjusted Ideal Body Weight (kg) (Calculated): 71.64 kg  Adjusted % Ideal Body

## 2024-02-16 NOTE — PLAN OF CARE
Problem: Behavior  Goal: Pt/Family maintain appropriate behavior and adhere to behavioral management agreement, if implemented  Description: INTERVENTIONS:  1. Assess patient/family's coping skills and  non-compliant behavior (including use of illegal substances)  2. Notify security of behavior or suspected illegal substances which indicate the need for search of the family and/or belongings  3. Encourage verbalization of thoughts and concerns in a socially appropriate manner  4. Utilize positive, consistent limit setting strategies supporting safety of patient, staff and others  5. Encourage participation in the decision making process about the behavioral management agreement  6. If a visitor's behavior poses a threat to safety call refer to organization policy.  7. Initiate consult with , Psychosocial CNS, Spiritual Care as appropriate  Outcome: Not Progressing     Problem: Discharge Planning  Goal: Discharge to home or other facility with appropriate resources  2/15/2024 2121 by Shawna Parks RN  Outcome: Progressing  Flowsheets (Taken 2/15/2024 1945)  Discharge to home or other facility with appropriate resources: Identify barriers to discharge with patient and caregiver     Problem: Pain  Goal: Verbalizes/displays adequate comfort level or baseline comfort level  2/15/2024 2121 by Shawna Parks, RN  Outcome: Progressing  Flowsheets  Taken 2/15/2024 1942  Verbalizes/displays adequate comfort level or baseline comfort level: Encourage patient to monitor pain and request assistance  Taken 2/15/2024 1931  Verbalizes/displays adequate comfort level or baseline comfort level: Encourage patient to monitor pain and request assistance       Problem: Skin/Tissue Integrity  Goal: Absence of new skin breakdown  Description: 1.  Monitor for areas of redness and/or skin breakdown  2.  Assess vascular access sites hourly  3.  Every 4-6 hours minimum:  Change oxygen saturation probe site  4.  Every 4-6  hours:  If on nasal continuous positive airway pressure, respiratory therapy assess nares and determine need for appliance change or resting period.  2/15/2024 2121 by Shawna Parks RN  Outcome: Progressing     Problem: Safety - Adult  Goal: Free from fall injury  2/15/2024 2121 by Shawna Parks RN  Outcome: Progressing     Problem: ABCDS Injury Assessment  Goal: Absence of physical injury  2/15/2024 2121 by Shawna Parks RN  Outcome: Progressing     Problem: Skin/Tissue Integrity - Adult  Goal: Skin integrity remains intact  2/15/2024 2121 by Shawna Parks RN  Outcome: Progressing  Flowsheets  Taken 2/15/2024 2120 by Shawna Parks RN  Skin Integrity Remains Intact: Monitor for areas of redness and/or skin breakdown  Taken 2/15/2024 1945 by Shawna Parks RN  Skin Integrity Remains Intact: Monitor for areas of redness and/or skin breakdown    Goal: Incisions, wounds, or drain sites healing without S/S of infection  2/15/2024 2121 by Shawna Parks RN  Outcome: Progressing  Flowsheets  Taken 2/15/2024 2120 by Shawna Parks RN  Incisions, Wounds, or Drain Sites Healing Without Sign and Symptoms of Infection: ADMISSION and DAILY: Assess and document risk factors for pressure ulcer development  Taken 2/15/2024 1945 by Shawna Parks RN  Incisions, Wounds, or Drain Sites Healing Without Sign and Symptoms of Infection: ADMISSION and DAILY: Assess and document risk factors for pressure ulcer development     Problem: Musculoskeletal - Adult  Goal: Return mobility to safest level of function  2/15/2024 2121 by Shawna Parks RN  Outcome: Progressing  Flowsheets (Taken 2/15/2024 1945)  Return Mobility to Safest Level of Function: Assess patient stability and activity tolerance for standing, transferring and ambulating with or without assistive devices     Problem: Genitourinary - Adult  Goal: Absence of urinary retention  2/15/2024 2121 by Shawna Parks RN  Outcome: Progressing  Flowsheets (Taken 2/15/2024

## 2024-02-16 NOTE — PROGRESS NOTES
Occupational Therapy  Facility/Department: [facility] [id number] - MED SURG  Occupational Therapy Initial Assessment and Treatment    Name: Jackson Martínez  : 1950  MRN: [id number]  Date of Service: 2024    Discharge Recommendations:  Subacute/Skilled Nursing Facility, IP Rehab (pending progress in acute setting)  OT Equipment Recommendations  Equipment Needed: No  Other: Defer to next level of care     Therapy discharge recommendations are subject to collaboration from the patient’s interdisciplinary healthcare team, including MD and case management recommendations.    Barriers to Home Discharge:   [x] Steps to access home entry or bed/bath:   [x] Unable to transfer, ambulate, or propel wheelchair household distances without assist   [x] Limited available assist at home upon discharge    [] Patient or family requests d/c to post-acute facility    [] Poor cognition/safety awareness for d/c to home alone    [] Unable to maintain ordered weight bearing status    [] Patient with salient signs of long-standing immobility   [x] Decreased independence with ADLs   [x] Increased risk for falls   [] Other:    If pt is unable to be seen after this session, please let this note serve as discharge summary.  Please see case management note for discharge disposition.  Thank you.    Patient Diagnosis(es): The primary encounter diagnosis was Severe sepsis (HCC). Diagnoses of Osteomyelitis of third toe of right foot (HCC), Cellulitis of right foot, and Gangrene of right foot (HCC) were also pertinent to this visit.  Past Medical History:  has a past medical history of Bronchitis.  Past Surgical History:  has a past surgical history that includes Splenectomy; Nasal septum surgery; Tonsillectomy; Adenoidectomy; Foot Debridement (Right, 2024); and Foot surgery (Right, 2024).           Assessment   Performance deficits / Impairments: Decreased functional mobility ;Decreased endurance;Decreased ADL status;Decreased  at RW to perform ADLs w/  CGA  Short Term Goal 4: Pt will tolerate x15 BUE ther ex  Patient Goals   Patient goals : \"to feel better and take care of myself\"       Therapy Time   Individual Concurrent Group Co-treatment   Time In 1435         Time Out 1508         Minutes 33         Timed Code Treatment Minutes: 23 Minutes (+10 min OT eval)       Martina Shane, OT

## 2024-02-16 NOTE — CARE COORDINATION
Chart reviewed day 7. Care provided by vascular, podiatry, ID and IM. Pt is from home with his wife and was IPTA. Pt had R foot amp this week and will have R BKA with IPOP Monday. IVABX have been discontinued. Will continue to follow course for needs. Katharine Ba RN

## 2024-02-16 NOTE — PROGRESS NOTES
Hospital Medicine Progress Note      Date of Admission: 2/9/2024  Hospital Day: 8           Chief Admission Complaint:  rt foot pain     Subjective:  resting in bed, still notes ongoing pain issues after surgery, no overnight events, vasc surg service at bedside as well     Presenting Admission History:          Jackson Martínez is a 73 y.o. male with pmh of tobacco abuse who presents with ongoing right foot pain with wound and drainage, found to be in severe sepsis.      In the ER, patient was hypertensive on arrival, tachycardic 110s. XR right foot on arrival showing bony resorption of 3rd toe suspicious for osteomyelitis with soft tissue swelling and neg soft tissue gas. Labwork significant for Lactate 2.8, .8, , leukocytosis of 29.4, Hgb of 9.2. Received sepsis bolus, IV Vanc and Zosyn x 1. Patient admitted to the floor for continuous monitoring, IV meds and fluids, specialty evaluation.      Patient states his symptoms started 2 weeks ago when he cut his right third toenail with a clipper and developed a wound. Has had progressively worsening pain, drainage, bleeding, and difficulty bearing weight on his right foot due to the pain.  Did not seek care at an urgent care.  Has only taken Tylenol or Advil at home as needed for pain.  He denies prior history of similar wounds, diabetes, neuropathy, history of hypertension or hyperlipidemia. Mentions he had a \"pain pill problem\" years ago.  He denies recent fever, chills, chest pain, SOB, abdominal pain, nausea or vomiting, diarrhea constipation, hematuria or dysuria, bloody stools, headaches, dizziness, acute vision or hearing changes, LOC, numbness or tingling.        Assessment/Plan:       Current Principal Problem:  Sepsis (HCC)       Sepsis secondary osteomyelitis  X-ray of foot was consistent with osteomyelitis.  Podiatry and ID were consulted, apprec recs and mgmt  Follow-up MRI for surgical planning  I IV antibiotics: Vancomycin plus cefepime

## 2024-02-16 NOTE — PROGRESS NOTES
Physical Therapy  Facility/Department: [facility] [address] - MED SURG  Physical Therapy Initial Assessment/treatment    Name: Jackson Martínez  : 1950  MRN: [id number]  Date of Service: 2024    Discharge Recommendations:  Continue to assess pending progress, Subacute/Skilled Nursing Facility, IP Rehab   PT Equipment Recommendations  Equipment Needed: Yes  Other: CTA      Therapy discharge recommendations take into account each patient's current medical complexities and are subject to input/oversight from the patient's healthcare team.   Barriers to Home Discharge:   [x] Steps to access home entry or bed/bath:   [x] Unable to transfer, ambulate, or propel wheelchair household distances without assist   [] Limited available assist at home upon discharge    [] Patient or family requests d/c to post-acute facility    [x] Poor cognition/safety awareness for d/c to home alone    []Unable to maintain ordered weight bearing status    [] Patient with salient signs of long-standing immobility   [x] Patient is at risk for falls    [] Other:    If pt is unable to be seen after this session, please let this note serve as discharge summary.  Please see case management note for discharge disposition.  Thank you.    Patient Diagnosis(es): The primary encounter diagnosis was Severe sepsis (HCC). Diagnoses of Osteomyelitis of third toe of right foot (HCC), Cellulitis of right foot, and Gangrene of right foot (HCC) were also pertinent to this visit.  Past Medical History:  has a past medical history of Bronchitis.  Past Surgical History:  has a past surgical history that includes Splenectomy; Nasal septum surgery; Tonsillectomy; Adenoidectomy; Foot Debridement (Right, 2024); and Foot surgery (Right, 2024).    Assessment   Body Structures, Functions, Activity Limitations Requiring Skilled Therapeutic Intervention: Decreased functional mobility ;Decreased ROM;Decreased cognition;Decreased endurance;Decreased safe

## 2024-02-17 LAB
ANION GAP SERPL CALCULATED.3IONS-SCNC: 6 MMOL/L (ref 3–16)
BASOPHILS # BLD: 0 K/UL (ref 0–0.2)
BASOPHILS NFR BLD: 0.7 %
BUN SERPL-MCNC: 13 MG/DL (ref 7–20)
CALCIUM SERPL-MCNC: 8.1 MG/DL (ref 8.3–10.6)
CHLORIDE SERPL-SCNC: 105 MMOL/L (ref 99–110)
CO2 SERPL-SCNC: 27 MMOL/L (ref 21–32)
CREAT SERPL-MCNC: <0.5 MG/DL (ref 0.8–1.3)
CRP SERPL-MCNC: 15.4 MG/L (ref 0–5.1)
DEPRECATED RDW RBC AUTO: 14.5 % (ref 12.4–15.4)
EOSINOPHIL # BLD: 0.1 K/UL (ref 0–0.6)
EOSINOPHIL NFR BLD: 1.6 %
GFR SERPLBLD CREATININE-BSD FMLA CKD-EPI: >60 ML/MIN/{1.73_M2}
GLUCOSE BLD-MCNC: 177 MG/DL (ref 70–99)
GLUCOSE BLD-MCNC: 211 MG/DL (ref 70–99)
GLUCOSE BLD-MCNC: 255 MG/DL (ref 70–99)
GLUCOSE SERPL-MCNC: 139 MG/DL (ref 70–99)
HCT VFR BLD AUTO: 26.8 % (ref 40.5–52.5)
HGB BLD-MCNC: 8.9 G/DL (ref 13.5–17.5)
LYMPHOCYTES # BLD: 1.7 K/UL (ref 1–5.1)
LYMPHOCYTES NFR BLD: 24.9 %
MCH RBC QN AUTO: 29.9 PG (ref 26–34)
MCHC RBC AUTO-ENTMCNC: 33.2 G/DL (ref 31–36)
MCV RBC AUTO: 90 FL (ref 80–100)
MONOCYTES # BLD: 0.8 K/UL (ref 0–1.3)
MONOCYTES NFR BLD: 12.5 %
NEUTROPHILS # BLD: 4 K/UL (ref 1.7–7.7)
NEUTROPHILS NFR BLD: 60.3 %
PERFORMED ON: ABNORMAL
PLATELET # BLD AUTO: 840 K/UL (ref 135–450)
PMV BLD AUTO: 6.5 FL (ref 5–10.5)
POTASSIUM SERPL-SCNC: 4 MMOL/L (ref 3.5–5.1)
RBC # BLD AUTO: 2.98 M/UL (ref 4.2–5.9)
SODIUM SERPL-SCNC: 138 MMOL/L (ref 136–145)
WBC # BLD AUTO: 6.7 K/UL (ref 4–11)

## 2024-02-17 PROCEDURE — 6370000000 HC RX 637 (ALT 250 FOR IP): Performed by: PODIATRIST

## 2024-02-17 PROCEDURE — 80048 BASIC METABOLIC PNL TOTAL CA: CPT

## 2024-02-17 PROCEDURE — 1200000000 HC SEMI PRIVATE

## 2024-02-17 PROCEDURE — 36415 COLL VENOUS BLD VENIPUNCTURE: CPT

## 2024-02-17 PROCEDURE — 86140 C-REACTIVE PROTEIN: CPT

## 2024-02-17 PROCEDURE — 94640 AIRWAY INHALATION TREATMENT: CPT

## 2024-02-17 PROCEDURE — 85025 COMPLETE CBC W/AUTO DIFF WBC: CPT

## 2024-02-17 PROCEDURE — 2580000003 HC RX 258: Performed by: PODIATRIST

## 2024-02-17 PROCEDURE — 6360000002 HC RX W HCPCS: Performed by: PODIATRIST

## 2024-02-17 RX ADMIN — OXYCODONE AND ACETAMINOPHEN 1 TABLET: 7.5; 325 TABLET ORAL at 04:03

## 2024-02-17 RX ADMIN — MORPHINE SULFATE 4 MG: 4 INJECTION, SOLUTION INTRAMUSCULAR; INTRAVENOUS at 02:14

## 2024-02-17 RX ADMIN — Medication 2 PUFF: at 19:55

## 2024-02-17 RX ADMIN — SODIUM CHLORIDE, PRESERVATIVE FREE 10 ML: 5 INJECTION INTRAVENOUS at 21:09

## 2024-02-17 RX ADMIN — MORPHINE SULFATE 4 MG: 4 INJECTION, SOLUTION INTRAMUSCULAR; INTRAVENOUS at 12:07

## 2024-02-17 RX ADMIN — MORPHINE SULFATE 4 MG: 4 INJECTION, SOLUTION INTRAMUSCULAR; INTRAVENOUS at 16:13

## 2024-02-17 RX ADMIN — Medication 2 PUFF: at 15:24

## 2024-02-17 RX ADMIN — INSULIN LISPRO 2 UNITS: 100 INJECTION, SOLUTION INTRAVENOUS; SUBCUTANEOUS at 12:07

## 2024-02-17 RX ADMIN — OXYCODONE AND ACETAMINOPHEN 1 TABLET: 7.5; 325 TABLET ORAL at 08:56

## 2024-02-17 RX ADMIN — MORPHINE SULFATE 4 MG: 4 INJECTION, SOLUTION INTRAMUSCULAR; INTRAVENOUS at 06:50

## 2024-02-17 RX ADMIN — OXYCODONE AND ACETAMINOPHEN 1 TABLET: 7.5; 325 TABLET ORAL at 19:11

## 2024-02-17 RX ADMIN — OXYCODONE AND ACETAMINOPHEN 1 TABLET: 7.5; 325 TABLET ORAL at 23:13

## 2024-02-17 RX ADMIN — MORPHINE SULFATE 4 MG: 4 INJECTION, SOLUTION INTRAMUSCULAR; INTRAVENOUS at 21:09

## 2024-02-17 RX ADMIN — OXYCODONE AND ACETAMINOPHEN 1 TABLET: 7.5; 325 TABLET ORAL at 14:56

## 2024-02-17 ASSESSMENT — PAIN DESCRIPTION - DESCRIPTORS
DESCRIPTORS: ACHING

## 2024-02-17 ASSESSMENT — PAIN DESCRIPTION - LOCATION
LOCATION: LEG

## 2024-02-17 ASSESSMENT — PAIN SCALES - GENERAL
PAINLEVEL_OUTOF10: 8
PAINLEVEL_OUTOF10: 0
PAINLEVEL_OUTOF10: 8
PAINLEVEL_OUTOF10: 9
PAINLEVEL_OUTOF10: 7
PAINLEVEL_OUTOF10: 9

## 2024-02-17 ASSESSMENT — PAIN - FUNCTIONAL ASSESSMENT
PAIN_FUNCTIONAL_ASSESSMENT: PREVENTS OR INTERFERES SOME ACTIVE ACTIVITIES AND ADLS
PAIN_FUNCTIONAL_ASSESSMENT: PREVENTS OR INTERFERES SOME ACTIVE ACTIVITIES AND ADLS

## 2024-02-17 ASSESSMENT — PAIN DESCRIPTION - ORIENTATION
ORIENTATION: RIGHT

## 2024-02-17 NOTE — PLAN OF CARE
Problem: Discharge Planning  Goal: Discharge to home or other facility with appropriate resources  2/16/2024 2330 by Leanna Ribera LPN  Outcome: Progressing  2/16/2024 1239 by Pau Wheat RN  Outcome: Progressing     Problem: Pain  Goal: Verbalizes/displays adequate comfort level or baseline comfort level  2/16/2024 2330 by Leanna Ribera LPN  Outcome: Progressing  2/16/2024 1239 by Pau Wheat RN  Outcome: Progressing  Flowsheets  Taken 2/16/2024 0351 by Shawna Parks RN  Verbalizes/displays adequate comfort level or baseline comfort level: Encourage patient to monitor pain and request assistance  Taken 2/16/2024 0345 by Shawna Parks RN  Verbalizes/displays adequate comfort level or baseline comfort level: Encourage patient to monitor pain and request assistance  Taken 2/15/2024 2317 by Shawna Parks RN  Verbalizes/displays adequate comfort level or baseline comfort level: Encourage patient to monitor pain and request assistance     Problem: Skin/Tissue Integrity  Goal: Absence of new skin breakdown  Description: 1.  Monitor for areas of redness and/or skin breakdown  2.  Assess vascular access sites hourly  3.  Every 4-6 hours minimum:  Change oxygen saturation probe site  4.  Every 4-6 hours:  If on nasal continuous positive airway pressure, respiratory therapy assess nares and determine need for appliance change or resting period.  2/16/2024 2330 by Leanna Ribera LPN  Outcome: Progressing  2/16/2024 1239 by Pau Wheat RN  Outcome: Progressing     Problem: Safety - Adult  Goal: Free from fall injury  Outcome: Progressing     Problem: ABCDS Injury Assessment  Goal: Absence of physical injury  Outcome: Progressing     Problem: Skin/Tissue Integrity - Adult  Goal: Skin integrity remains intact  Outcome: Progressing  Goal: Incisions, wounds, or drain sites healing without S/S of infection  Outcome: Progressing     Problem: Musculoskeletal - Adult  Goal: Return mobility to safest level of

## 2024-02-17 NOTE — PROGRESS NOTES
Hospital Medicine Progress Note      Date of Admission: 2/9/2024  Hospital Day: 9      Chief Admission Complaint:  rt foot pain     Subjective:  resting in bed, eating bkfst, no issues, no  overnight events,     Presenting Admission History:          Jackson Martínez is a 73 y.o. male with pmh of tobacco abuse who presents with ongoing right foot pain with wound and drainage, found to be in severe sepsis.      In the ER, patient was hypertensive on arrival, tachycardic 110s. XR right foot on arrival showing bony resorption of 3rd toe suspicious for osteomyelitis with soft tissue swelling and neg soft tissue gas. Labwork significant for Lactate 2.8, .8, , leukocytosis of 29.4, Hgb of 9.2. Received sepsis bolus, IV Vanc and Zosyn x 1. Patient admitted to the floor for continuous monitoring, IV meds and fluids, specialty evaluation.      Patient states his symptoms started 2 weeks ago when he cut his right third toenail with a clipper and developed a wound. Has had progressively worsening pain, drainage, bleeding, and difficulty bearing weight on his right foot due to the pain.  Did not seek care at an urgent care.  Has only taken Tylenol or Advil at home as needed for pain.  He denies prior history of similar wounds, diabetes, neuropathy, history of hypertension or hyperlipidemia. Mentions he had a \"pain pill problem\" years ago.  He denies recent fever, chills, chest pain, SOB, abdominal pain, nausea or vomiting, diarrhea constipation, hematuria or dysuria, bloody stools, headaches, dizziness, acute vision or hearing changes, LOC, numbness or tingling.        Assessment/Plan:       Current Principal Problem:  Sepsis (HCC)       Sepsis secondary osteomyelitis  X-ray of foot was consistent with osteomyelitis.  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   Cardiovascular:  Regular rate and rhythm.  Abdomen:  Soft, non-tender, non-distended.  Musculoskeletal:  No edema, rt LE (now shortened) in post op bandage noted  Neurologic:  Non-focal  Psychiatric:  Alert and oriented    /62   Pulse 77   Temp 98.6 °F (37 °C) (Oral)   Resp 16   Ht 1.753 m (5' 9\")   Wt 68 kg (150 lb)   SpO2 93%   BMI 22.15 kg/m²     Diet: Diet NPO  ADULT DIET; Regular; 5 carb choices (75 gm/meal)  ADULT ORAL NUTRITION SUPPLEMENT; Lunch; Diabetic Oral Supplement  DVT Prophylaxis: []PPx LMWH  []SQ Heparin  [x]IPC/SCDs  []Eliquis  []Xarelto  []Coumadin  []Other -      Code status: Full Code  PT/OT Eval Status:   []NOT yet ordered  []Ordered and Pending   [x]Seen with Recommendations for:  []Home independently  []Home w/ assist  []HHC  [x]SNF  [x]Acute Rehab    Anticipated Discharge Day/Date:  after pending surgery Monday    Anticipated Discharge Location: []Home  []HHC  [x]SNF  []Acute Rehab  []ECF  []LTAC  []Hospice  []Other -      Consults:      IP CONSULT TO HOSPITALIST  IP CONSULT TO PODIATRY  PHARMACY TO DOSE VANCOMYCIN  IP CONSULT TO PHARMACY  IP CONSULT TO INFECTIOUS DISEASES  IP CONSULT TO VASCULAR SURGERY      This patient has a high likelihood of being discharged tomorrow and is appropriate for A1 Discharge Unit in AM pending clinical course overnight: []Yes

## 2024-02-17 NOTE — PLAN OF CARE
Problem: Discharge Planning  Goal: Discharge to home or other facility with appropriate resources  2/17/2024 1251 by Lucy Bentley RN  Outcome: Progressing  2/16/2024 2330 by Leanna Ribera LPN  Outcome: Progressing     Problem: Pain  Goal: Verbalizes/displays adequate comfort level or baseline comfort level  2/17/2024 1251 by Lucy Bentley RN  Outcome: Progressing  2/16/2024 2330 by Leanna Ribera LPN  Outcome: Progressing     Problem: Skin/Tissue Integrity  Goal: Absence of new skin breakdown  Description: 1.  Monitor for areas of redness and/or skin breakdown  2.  Assess vascular access sites hourly  3.  Every 4-6 hours minimum:  Change oxygen saturation probe site  4.  Every 4-6 hours:  If on nasal continuous positive airway pressure, respiratory therapy assess nares and determine need for appliance change or resting period.  2/17/2024 1251 by Lucy Bentley RN  Outcome: Progressing  2/16/2024 2330 by Leanna Ribera LPN  Outcome: Progressing     Problem: Safety - Adult  Goal: Free from fall injury  2/17/2024 1251 by Lucy Bentley RN  Outcome: Progressing  2/16/2024 2330 by Leanna Ribera LPN  Outcome: Progressing     Problem: ABCDS Injury Assessment  Goal: Absence of physical injury  2/17/2024 1251 by Lucy Bentley RN  Outcome: Progressing  2/16/2024 2330 by Leanna Ribera LPN  Outcome: Progressing     Problem: Skin/Tissue Integrity - Adult  Goal: Skin integrity remains intact  2/17/2024 1251 by Lucy Bentley RN  Outcome: Progressing  2/16/2024 2330 by Leanna Ribera LPN  Outcome: Progressing  Goal: Incisions, wounds, or drain sites healing without S/S of infection  2/17/2024 1251 by Lucy Bentley RN  Outcome: Progressing  2/16/2024 2330 by Leanna Ribera LPN  Outcome: Progressing     Problem: Musculoskeletal - Adult  Goal: Return mobility to safest level of function  2/17/2024 1251 by Lucy Bentley RN  Outcome: Progressing  2/16/2024 2330 by Leanna Ribera

## 2024-02-18 LAB
BACTERIA SPEC AEROBE CULT: ABNORMAL
BACTERIA SPEC ANAEROBE CULT: ABNORMAL
GLUCOSE BLD-MCNC: 145 MG/DL (ref 70–99)
GLUCOSE BLD-MCNC: 150 MG/DL (ref 70–99)
GLUCOSE BLD-MCNC: 151 MG/DL (ref 70–99)
GLUCOSE BLD-MCNC: 184 MG/DL (ref 70–99)
GRAM STN SPEC: ABNORMAL
ORGANISM: ABNORMAL
PERFORMED ON: ABNORMAL

## 2024-02-18 PROCEDURE — 2580000003 HC RX 258: Performed by: PODIATRIST

## 2024-02-18 PROCEDURE — 94640 AIRWAY INHALATION TREATMENT: CPT

## 2024-02-18 PROCEDURE — 1200000000 HC SEMI PRIVATE

## 2024-02-18 PROCEDURE — 6360000002 HC RX W HCPCS: Performed by: PODIATRIST

## 2024-02-18 PROCEDURE — 6370000000 HC RX 637 (ALT 250 FOR IP): Performed by: PODIATRIST

## 2024-02-18 RX ADMIN — MORPHINE SULFATE 4 MG: 4 INJECTION, SOLUTION INTRAMUSCULAR; INTRAVENOUS at 01:03

## 2024-02-18 RX ADMIN — OXYCODONE AND ACETAMINOPHEN 1 TABLET: 7.5; 325 TABLET ORAL at 08:21

## 2024-02-18 RX ADMIN — MORPHINE SULFATE 4 MG: 4 INJECTION, SOLUTION INTRAMUSCULAR; INTRAVENOUS at 19:54

## 2024-02-18 RX ADMIN — SODIUM CHLORIDE, PRESERVATIVE FREE 10 ML: 5 INJECTION INTRAVENOUS at 19:54

## 2024-02-18 RX ADMIN — OXYCODONE AND ACETAMINOPHEN 1 TABLET: 7.5; 325 TABLET ORAL at 22:19

## 2024-02-18 RX ADMIN — OXYCODONE AND ACETAMINOPHEN 1 TABLET: 7.5; 325 TABLET ORAL at 17:06

## 2024-02-18 RX ADMIN — Medication 2 PUFF: at 12:16

## 2024-02-18 RX ADMIN — MORPHINE SULFATE 4 MG: 4 INJECTION, SOLUTION INTRAMUSCULAR; INTRAVENOUS at 10:40

## 2024-02-18 RX ADMIN — MORPHINE SULFATE 4 MG: 4 INJECTION, SOLUTION INTRAMUSCULAR; INTRAVENOUS at 15:15

## 2024-02-18 RX ADMIN — Medication 2 PUFF: at 20:08

## 2024-02-18 RX ADMIN — OXYCODONE AND ACETAMINOPHEN 1 TABLET: 7.5; 325 TABLET ORAL at 03:52

## 2024-02-18 RX ADMIN — OXYCODONE AND ACETAMINOPHEN 1 TABLET: 7.5; 325 TABLET ORAL at 12:40

## 2024-02-18 RX ADMIN — MORPHINE SULFATE 4 MG: 4 INJECTION, SOLUTION INTRAMUSCULAR; INTRAVENOUS at 05:13

## 2024-02-18 ASSESSMENT — PAIN SCALES - GENERAL
PAINLEVEL_OUTOF10: 8
PAINLEVEL_OUTOF10: 9
PAINLEVEL_OUTOF10: 8
PAINLEVEL_OUTOF10: 8
PAINLEVEL_OUTOF10: 10
PAINLEVEL_OUTOF10: 8
PAINLEVEL_OUTOF10: 10
PAINLEVEL_OUTOF10: 9
PAINLEVEL_OUTOF10: 8
PAINLEVEL_OUTOF10: 8

## 2024-02-18 ASSESSMENT — PAIN DESCRIPTION - PAIN TYPE: TYPE: ACUTE PAIN

## 2024-02-18 ASSESSMENT — PAIN DESCRIPTION - LOCATION
LOCATION: LEG
LOCATION: FOOT
LOCATION: LEG

## 2024-02-18 ASSESSMENT — PAIN DESCRIPTION - DESCRIPTORS
DESCRIPTORS: ACHING

## 2024-02-18 ASSESSMENT — PAIN DESCRIPTION - FREQUENCY
FREQUENCY: CONTINUOUS

## 2024-02-18 ASSESSMENT — PAIN DESCRIPTION - ORIENTATION
ORIENTATION: RIGHT

## 2024-02-18 ASSESSMENT — PAIN DESCRIPTION - ONSET
ONSET: ON-GOING

## 2024-02-18 NOTE — PLAN OF CARE
Problem: Discharge Planning  Goal: Discharge to home or other facility with appropriate resources  Outcome: Progressing     Problem: Pain  Goal: Verbalizes/displays adequate comfort level or baseline comfort level  Outcome: Progressing     Problem: Skin/Tissue Integrity  Goal: Absence of new skin breakdown  Description: 1.  Monitor for areas of redness and/or skin breakdown  2.  Assess vascular access sites hourly  3.  Every 4-6 hours minimum:  Change oxygen saturation probe site  4.  Every 4-6 hours:  If on nasal continuous positive airway pressure, respiratory therapy assess nares and determine need for appliance change or resting period.  Outcome: Progressing     Problem: Safety - Adult  Goal: Free from fall injury  Outcome: Progressing     Problem: ABCDS Injury Assessment  Goal: Absence of physical injury  Outcome: Progressing     Problem: Skin/Tissue Integrity - Adult  Goal: Skin integrity remains intact  Outcome: Progressing  Goal: Incisions, wounds, or drain sites healing without S/S of infection  Outcome: Progressing     Problem: Musculoskeletal - Adult  Goal: Return mobility to safest level of function  Outcome: Progressing     Problem: Genitourinary - Adult  Goal: Absence of urinary retention  Outcome: Progressing     Problem: Infection - Adult  Goal: Absence of infection at discharge  Outcome: Progressing  Goal: Absence of infection during hospitalization  Outcome: Progressing     Problem: Metabolic/Fluid and Electrolytes - Adult  Goal: Glucose maintained within prescribed range  Outcome: Progressing     Problem: Hematologic - Adult  Goal: Maintains hematologic stability  Outcome: Progressing     Problem: Chronic Conditions and Co-morbidities  Goal: Patient's chronic conditions and co-morbidity symptoms are monitored and maintained or improved  Outcome: Progressing     Problem: Behavior  Goal: Pt/Family maintain appropriate behavior and adhere to behavioral management agreement, if

## 2024-02-18 NOTE — PROGRESS NOTES
Hospital Medicine Progress Note      Date of Admission: 2/9/2024  Hospital Day: 10        Chief Admission Complaint:  rt foot pain     Subjective:  resting in bed,  no issues, no  overnight events, aware of being npo at midnight     Presenting Admission History:          Jackson Martínez is a 73 y.o. male with pmh of tobacco abuse who presents with ongoing right foot pain with wound and drainage, found to be in severe sepsis.      In the ER, patient was hypertensive on arrival, tachycardic 110s. XR right foot on arrival showing bony resorption of 3rd toe suspicious for osteomyelitis with soft tissue swelling and neg soft tissue gas. Labwork significant for Lactate 2.8, .8, , leukocytosis of 29.4, Hgb of 9.2. Received sepsis bolus, IV Vanc and Zosyn x 1. Patient admitted to the floor for continuous monitoring, IV meds and fluids, specialty evaluation.      Patient states his symptoms started 2 weeks ago when he cut his right third toenail with a clipper and developed a wound. Has had progressively worsening pain, drainage, bleeding, and difficulty bearing weight on his right foot due to the pain.  Did not seek care at an urgent care.  Has only taken Tylenol or Advil at home as needed for pain.  He denies prior history of similar wounds, diabetes, neuropathy, history of hypertension or hyperlipidemia. Mentions he had a \"pain pill problem\" years ago.  He denies recent fever, chills, chest pain, SOB, abdominal pain, nausea or vomiting, diarrhea constipation, hematuria or dysuria, bloody stools, headaches, dizziness, acute vision or hearing changes, LOC, numbness or tingling.        Assessment/Plan:       Current Principal Problem:  Sepsis (HCC)       Sepsis secondary osteomyelitis  X-ray of foot was consistent with osteomyelitis.  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   Cardiovascular:  Regular rate and rhythm.  Abdomen:  Soft, non-tender, non-distended.  Musculoskeletal:  No edema, rt LE (now shortened) in post op bandage noted  Neurologic:  Non-focal  Psychiatric:  Alert and oriented    /74   Pulse 67   Temp 98.2 °F (36.8 °C) (Oral)   Resp 16   Ht 1.753 m (5' 9\")   Wt 68 kg (150 lb)   SpO2 97%   BMI 22.15 kg/m²     Diet: Diet NPO  ADULT ORAL NUTRITION SUPPLEMENT; Lunch; Diabetic Oral Supplement  ADULT DIET; Easy to Chew; 5 carb choices (75 gm/meal)  DVT Prophylaxis: []PPx LMWH  []SQ Heparin  [x]IPC/SCDs  []Eliquis  []Xarelto  []Coumadin  []Other -      Code status: Full Code  PT/OT Eval Status:   []NOT yet ordered  []Ordered and Pending   [x]Seen with Recommendations for:  []Home independently  []Home w/ assist  []HHC  [x]SNF  [x]Acute Rehab    Anticipated Discharge Day/Date:  Pending surgery Monday, ?by Wed    Anticipated Discharge Location: []Home  []HHC  [x]SNF  [x]Acute Rehab  []ECF  []LTAC  []Hospice  []Other -      Consults:      IP CONSULT TO HOSPITALIST  IP CONSULT TO PODIATRY  PHARMACY TO DOSE VANCOMYCIN  IP CONSULT TO PHARMACY  IP CONSULT TO INFECTIOUS DISEASES  IP CONSULT TO VASCULAR SURGERY      This patient has a high likelihood of being discharged tomorrow and is appropriate for A1 Discharge Unit in AM pending clinical course overnight: []Yes

## 2024-02-19 ENCOUNTER — ANESTHESIA (OUTPATIENT)
Dept: OPERATING ROOM | Age: 74
DRG: 854 | End: 2024-02-19
Payer: MEDICARE

## 2024-02-19 LAB
GLUCOSE BLD-MCNC: 125 MG/DL (ref 70–99)
GLUCOSE BLD-MCNC: 149 MG/DL (ref 70–99)
GLUCOSE BLD-MCNC: 223 MG/DL (ref 70–99)
GLUCOSE BLD-MCNC: 230 MG/DL (ref 70–99)
PERFORMED ON: ABNORMAL

## 2024-02-19 PROCEDURE — 2700000000 HC OXYGEN THERAPY PER DAY

## 2024-02-19 PROCEDURE — 6370000000 HC RX 637 (ALT 250 FOR IP): Performed by: ANESTHESIOLOGY

## 2024-02-19 PROCEDURE — 88307 TISSUE EXAM BY PATHOLOGIST: CPT

## 2024-02-19 PROCEDURE — 0Y6H0Z3 DETACHMENT AT RIGHT LOWER LEG, LOW, OPEN APPROACH: ICD-10-PCS | Performed by: SURGERY

## 2024-02-19 PROCEDURE — 2500000003 HC RX 250 WO HCPCS: Performed by: NURSE ANESTHETIST, CERTIFIED REGISTERED

## 2024-02-19 PROCEDURE — 3600000003 HC SURGERY LEVEL 3 BASE: Performed by: SURGERY

## 2024-02-19 PROCEDURE — A4217 STERILE WATER/SALINE, 500 ML: HCPCS | Performed by: SURGERY

## 2024-02-19 PROCEDURE — 3700000001 HC ADD 15 MINUTES (ANESTHESIA): Performed by: SURGERY

## 2024-02-19 PROCEDURE — 7100000000 HC PACU RECOVERY - FIRST 15 MIN: Performed by: SURGERY

## 2024-02-19 PROCEDURE — 27886 AMPUTATION FOLLOW-UP SURGERY: CPT | Performed by: SURGERY

## 2024-02-19 PROCEDURE — 94761 N-INVAS EAR/PLS OXIMETRY MLT: CPT

## 2024-02-19 PROCEDURE — 6360000002 HC RX W HCPCS: Performed by: NURSE ANESTHETIST, CERTIFIED REGISTERED

## 2024-02-19 PROCEDURE — 6360000002 HC RX W HCPCS: Performed by: PODIATRIST

## 2024-02-19 PROCEDURE — 1200000000 HC SEMI PRIVATE

## 2024-02-19 PROCEDURE — 3700000000 HC ANESTHESIA ATTENDED CARE: Performed by: SURGERY

## 2024-02-19 PROCEDURE — 7100000001 HC PACU RECOVERY - ADDTL 15 MIN: Performed by: SURGERY

## 2024-02-19 PROCEDURE — 94640 AIRWAY INHALATION TREATMENT: CPT

## 2024-02-19 PROCEDURE — 6370000000 HC RX 637 (ALT 250 FOR IP): Performed by: PODIATRIST

## 2024-02-19 PROCEDURE — 3600000013 HC SURGERY LEVEL 3 ADDTL 15MIN: Performed by: SURGERY

## 2024-02-19 PROCEDURE — 2709999900 HC NON-CHARGEABLE SUPPLY: Performed by: SURGERY

## 2024-02-19 PROCEDURE — 88311 DECALCIFY TISSUE: CPT

## 2024-02-19 PROCEDURE — 2580000003 HC RX 258: Performed by: ANESTHESIOLOGY

## 2024-02-19 PROCEDURE — 2580000003 HC RX 258: Performed by: NURSE ANESTHETIST, CERTIFIED REGISTERED

## 2024-02-19 PROCEDURE — 2580000003 HC RX 258: Performed by: PODIATRIST

## 2024-02-19 PROCEDURE — 6360000002 HC RX W HCPCS: Performed by: SURGERY

## 2024-02-19 PROCEDURE — 2580000003 HC RX 258: Performed by: SURGERY

## 2024-02-19 PROCEDURE — 6360000002 HC RX W HCPCS: Performed by: ANESTHESIOLOGY

## 2024-02-19 RX ORDER — SODIUM CHLORIDE 0.9 % (FLUSH) 0.9 %
5-40 SYRINGE (ML) INJECTION PRN
Status: DISCONTINUED | OUTPATIENT
Start: 2024-02-19 | End: 2024-02-19 | Stop reason: HOSPADM

## 2024-02-19 RX ORDER — EPHEDRINE SULFATE 50 MG/ML
INJECTION INTRAVENOUS PRN
Status: DISCONTINUED | OUTPATIENT
Start: 2024-02-19 | End: 2024-02-19 | Stop reason: SDUPTHER

## 2024-02-19 RX ORDER — OXYCODONE HYDROCHLORIDE 5 MG/1
10 TABLET ORAL PRN
Status: COMPLETED | OUTPATIENT
Start: 2024-02-19 | End: 2024-02-19

## 2024-02-19 RX ORDER — SODIUM CHLORIDE, SODIUM LACTATE, POTASSIUM CHLORIDE, CALCIUM CHLORIDE 600; 310; 30; 20 MG/100ML; MG/100ML; MG/100ML; MG/100ML
INJECTION, SOLUTION INTRAVENOUS CONTINUOUS PRN
Status: DISCONTINUED | OUTPATIENT
Start: 2024-02-19 | End: 2024-02-19 | Stop reason: SDUPTHER

## 2024-02-19 RX ORDER — ONDANSETRON 2 MG/ML
INJECTION INTRAMUSCULAR; INTRAVENOUS PRN
Status: DISCONTINUED | OUTPATIENT
Start: 2024-02-19 | End: 2024-02-19 | Stop reason: SDUPTHER

## 2024-02-19 RX ORDER — LABETALOL HYDROCHLORIDE 5 MG/ML
INJECTION, SOLUTION INTRAVENOUS PRN
Status: DISCONTINUED | OUTPATIENT
Start: 2024-02-19 | End: 2024-02-19 | Stop reason: SDUPTHER

## 2024-02-19 RX ORDER — NOREPINEPHRINE BITARTRATE 1 MG/ML
INJECTION, SOLUTION INTRAVENOUS PRN
Status: DISCONTINUED | OUTPATIENT
Start: 2024-02-19 | End: 2024-02-19 | Stop reason: SDUPTHER

## 2024-02-19 RX ORDER — ONDANSETRON 2 MG/ML
4 INJECTION INTRAMUSCULAR; INTRAVENOUS
Status: DISCONTINUED | OUTPATIENT
Start: 2024-02-19 | End: 2024-02-19 | Stop reason: HOSPADM

## 2024-02-19 RX ORDER — LIDOCAINE HYDROCHLORIDE 10 MG/ML
1 INJECTION, SOLUTION INFILTRATION; PERINEURAL
Status: DISCONTINUED | OUTPATIENT
Start: 2024-02-19 | End: 2024-02-19 | Stop reason: HOSPADM

## 2024-02-19 RX ORDER — SODIUM CHLORIDE 0.9 % (FLUSH) 0.9 %
5-40 SYRINGE (ML) INJECTION EVERY 12 HOURS SCHEDULED
Status: DISCONTINUED | OUTPATIENT
Start: 2024-02-19 | End: 2024-02-19 | Stop reason: HOSPADM

## 2024-02-19 RX ORDER — SODIUM CHLORIDE, SODIUM LACTATE, POTASSIUM CHLORIDE, CALCIUM CHLORIDE 600; 310; 30; 20 MG/100ML; MG/100ML; MG/100ML; MG/100ML
INJECTION, SOLUTION INTRAVENOUS CONTINUOUS
Status: DISCONTINUED | OUTPATIENT
Start: 2024-02-19 | End: 2024-02-19 | Stop reason: HOSPADM

## 2024-02-19 RX ORDER — PROPOFOL 10 MG/ML
INJECTION, EMULSION INTRAVENOUS PRN
Status: DISCONTINUED | OUTPATIENT
Start: 2024-02-19 | End: 2024-02-19 | Stop reason: SDUPTHER

## 2024-02-19 RX ORDER — FENTANYL CITRATE 50 UG/ML
INJECTION, SOLUTION INTRAMUSCULAR; INTRAVENOUS PRN
Status: DISCONTINUED | OUTPATIENT
Start: 2024-02-19 | End: 2024-02-19 | Stop reason: SDUPTHER

## 2024-02-19 RX ORDER — SODIUM CHLORIDE 9 MG/ML
INJECTION, SOLUTION INTRAVENOUS PRN
Status: DISCONTINUED | OUTPATIENT
Start: 2024-02-19 | End: 2024-02-19 | Stop reason: HOSPADM

## 2024-02-19 RX ORDER — ROCURONIUM BROMIDE 10 MG/ML
INJECTION, SOLUTION INTRAVENOUS PRN
Status: DISCONTINUED | OUTPATIENT
Start: 2024-02-19 | End: 2024-02-19 | Stop reason: SDUPTHER

## 2024-02-19 RX ORDER — CEFAZOLIN SODIUM IN 0.9 % NACL 2 G/100 ML
PLASTIC BAG, INJECTION (ML) INTRAVENOUS
Status: DISPENSED
Start: 2024-02-19 | End: 2024-02-19

## 2024-02-19 RX ORDER — DIPHENHYDRAMINE HYDROCHLORIDE 50 MG/ML
12.5 INJECTION INTRAMUSCULAR; INTRAVENOUS
Status: DISCONTINUED | OUTPATIENT
Start: 2024-02-19 | End: 2024-02-19 | Stop reason: HOSPADM

## 2024-02-19 RX ORDER — PHENYLEPHRINE HCL IN 0.9% NACL 1 MG/10 ML
SYRINGE (ML) INTRAVENOUS PRN
Status: DISCONTINUED | OUTPATIENT
Start: 2024-02-19 | End: 2024-02-19 | Stop reason: SDUPTHER

## 2024-02-19 RX ORDER — LABETALOL HYDROCHLORIDE 5 MG/ML
5 INJECTION, SOLUTION INTRAVENOUS EVERY 10 MIN PRN
Status: DISCONTINUED | OUTPATIENT
Start: 2024-02-19 | End: 2024-02-19 | Stop reason: HOSPADM

## 2024-02-19 RX ORDER — OXYCODONE HYDROCHLORIDE 5 MG/1
5 TABLET ORAL PRN
Status: COMPLETED | OUTPATIENT
Start: 2024-02-19 | End: 2024-02-19

## 2024-02-19 RX ORDER — LIDOCAINE HYDROCHLORIDE 20 MG/ML
INJECTION, SOLUTION INFILTRATION; PERINEURAL PRN
Status: DISCONTINUED | OUTPATIENT
Start: 2024-02-19 | End: 2024-02-19 | Stop reason: SDUPTHER

## 2024-02-19 RX ORDER — MEPERIDINE HYDROCHLORIDE 50 MG/ML
12.5 INJECTION INTRAMUSCULAR; INTRAVENOUS; SUBCUTANEOUS EVERY 5 MIN PRN
Status: DISCONTINUED | OUTPATIENT
Start: 2024-02-19 | End: 2024-02-19 | Stop reason: HOSPADM

## 2024-02-19 RX ORDER — MIDAZOLAM HYDROCHLORIDE 5 MG/ML
2 INJECTION, SOLUTION INTRAMUSCULAR; INTRAVENOUS
Status: DISCONTINUED | OUTPATIENT
Start: 2024-02-19 | End: 2024-02-19 | Stop reason: HOSPADM

## 2024-02-19 RX ORDER — DEXAMETHASONE SODIUM PHOSPHATE 4 MG/ML
INJECTION, SOLUTION INTRA-ARTICULAR; INTRALESIONAL; INTRAMUSCULAR; INTRAVENOUS; SOFT TISSUE PRN
Status: DISCONTINUED | OUTPATIENT
Start: 2024-02-19 | End: 2024-02-19 | Stop reason: SDUPTHER

## 2024-02-19 RX ADMIN — EPHEDRINE SULFATE 10 MG: 50 INJECTION INTRAVENOUS at 12:08

## 2024-02-19 RX ADMIN — INSULIN LISPRO 1 UNITS: 100 INJECTION, SOLUTION INTRAVENOUS; SUBCUTANEOUS at 16:55

## 2024-02-19 RX ADMIN — ROCURONIUM BROMIDE 40 MG: 10 INJECTION, SOLUTION INTRAVENOUS at 11:28

## 2024-02-19 RX ADMIN — LIDOCAINE HYDROCHLORIDE 60 MG: 20 INJECTION, SOLUTION INFILTRATION; PERINEURAL at 11:26

## 2024-02-19 RX ADMIN — HYDROMORPHONE HYDROCHLORIDE 0.5 MG: 1 INJECTION, SOLUTION INTRAMUSCULAR; INTRAVENOUS; SUBCUTANEOUS at 13:02

## 2024-02-19 RX ADMIN — LABETALOL HYDROCHLORIDE 5 MG: 5 INJECTION, SOLUTION INTRAVENOUS at 12:00

## 2024-02-19 RX ADMIN — OXYCODONE AND ACETAMINOPHEN 1 TABLET: 7.5; 325 TABLET ORAL at 23:36

## 2024-02-19 RX ADMIN — MORPHINE SULFATE 4 MG: 4 INJECTION, SOLUTION INTRAMUSCULAR; INTRAVENOUS at 04:58

## 2024-02-19 RX ADMIN — OXYCODONE 10 MG: 5 TABLET ORAL at 13:12

## 2024-02-19 RX ADMIN — HYDROMORPHONE HYDROCHLORIDE 0.5 MG: 1 INJECTION, SOLUTION INTRAMUSCULAR; INTRAVENOUS; SUBCUTANEOUS at 12:50

## 2024-02-19 RX ADMIN — Medication 100 MCG: at 11:39

## 2024-02-19 RX ADMIN — HYDROMORPHONE HYDROCHLORIDE 0.5 MG: 1 INJECTION, SOLUTION INTRAMUSCULAR; INTRAVENOUS; SUBCUTANEOUS at 13:11

## 2024-02-19 RX ADMIN — MORPHINE SULFATE 4 MG: 4 INJECTION, SOLUTION INTRAMUSCULAR; INTRAVENOUS at 16:22

## 2024-02-19 RX ADMIN — NOREPINEPHRINE BITARTRATE 9.6 MCG: 1 INJECTION, SOLUTION INTRAVENOUS at 11:35

## 2024-02-19 RX ADMIN — NOREPINEPHRINE BITARTRATE 9.6 MCG: 1 INJECTION, SOLUTION INTRAVENOUS at 11:39

## 2024-02-19 RX ADMIN — SODIUM CHLORIDE, SODIUM LACTATE, POTASSIUM CHLORIDE, CALCIUM CHLORIDE: 600; 310; 30; 20 INJECTION, SOLUTION INTRAVENOUS at 11:22

## 2024-02-19 RX ADMIN — Medication 100 MCG: at 11:33

## 2024-02-19 RX ADMIN — FENTANYL CITRATE 50 MCG: 50 INJECTION, SOLUTION INTRAMUSCULAR; INTRAVENOUS at 11:26

## 2024-02-19 RX ADMIN — NOREPINEPHRINE BITARTRATE 4.8 MCG: 1 INJECTION, SOLUTION INTRAVENOUS at 11:32

## 2024-02-19 RX ADMIN — PROPOFOL 100 MG: 10 INJECTION, EMULSION INTRAVENOUS at 11:28

## 2024-02-19 RX ADMIN — MORPHINE SULFATE 4 MG: 4 INJECTION, SOLUTION INTRAMUSCULAR; INTRAVENOUS at 00:27

## 2024-02-19 RX ADMIN — SODIUM CHLORIDE, PRESERVATIVE FREE 10 ML: 5 INJECTION INTRAVENOUS at 08:19

## 2024-02-19 RX ADMIN — EPHEDRINE SULFATE 10 MG: 50 INJECTION INTRAVENOUS at 11:38

## 2024-02-19 RX ADMIN — HYDROMORPHONE HYDROCHLORIDE 0.5 MG: 1 INJECTION, SOLUTION INTRAMUSCULAR; INTRAVENOUS; SUBCUTANEOUS at 12:40

## 2024-02-19 RX ADMIN — PROPOFOL 25 MG: 10 INJECTION, EMULSION INTRAVENOUS at 12:20

## 2024-02-19 RX ADMIN — MORPHINE SULFATE 4 MG: 4 INJECTION, SOLUTION INTRAMUSCULAR; INTRAVENOUS at 09:06

## 2024-02-19 RX ADMIN — MEPERIDINE HYDROCHLORIDE 12.5 MG: 50 INJECTION, SOLUTION INTRAMUSCULAR; INTRAVENOUS; SUBCUTANEOUS at 12:43

## 2024-02-19 RX ADMIN — OXYCODONE AND ACETAMINOPHEN 1 TABLET: 7.5; 325 TABLET ORAL at 19:24

## 2024-02-19 RX ADMIN — MORPHINE SULFATE 4 MG: 4 INJECTION, SOLUTION INTRAMUSCULAR; INTRAVENOUS at 20:29

## 2024-02-19 RX ADMIN — FENTANYL CITRATE 50 MCG: 50 INJECTION, SOLUTION INTRAMUSCULAR; INTRAVENOUS at 11:53

## 2024-02-19 RX ADMIN — ROCURONIUM BROMIDE 10 MG: 10 INJECTION, SOLUTION INTRAVENOUS at 11:48

## 2024-02-19 RX ADMIN — SODIUM CHLORIDE, PRESERVATIVE FREE 6 ML: 5 INJECTION INTRAVENOUS at 14:21

## 2024-02-19 RX ADMIN — ONDANSETRON 4 MG: 2 INJECTION INTRAMUSCULAR; INTRAVENOUS at 11:34

## 2024-02-19 RX ADMIN — SODIUM CHLORIDE, PRESERVATIVE FREE 10 ML: 5 INJECTION INTRAVENOUS at 19:24

## 2024-02-19 RX ADMIN — Medication 2 PUFF: at 20:19

## 2024-02-19 RX ADMIN — DEXAMETHASONE SODIUM PHOSPHATE 8 MG: 4 INJECTION, SOLUTION INTRA-ARTICULAR; INTRALESIONAL; INTRAMUSCULAR; INTRAVENOUS; SOFT TISSUE at 11:34

## 2024-02-19 ASSESSMENT — PAIN DESCRIPTION - ORIENTATION
ORIENTATION: RIGHT

## 2024-02-19 ASSESSMENT — PAIN SCALES - GENERAL
PAINLEVEL_OUTOF10: 9
PAINLEVEL_OUTOF10: 8
PAINLEVEL_OUTOF10: 9
PAINLEVEL_OUTOF10: 10
PAINLEVEL_OUTOF10: 9
PAINLEVEL_OUTOF10: 10
PAINLEVEL_OUTOF10: 10
PAINLEVEL_OUTOF10: 4
PAINLEVEL_OUTOF10: 8
PAINLEVEL_OUTOF10: 10

## 2024-02-19 ASSESSMENT — PAIN SCALES - WONG BAKER
WONGBAKER_NUMERICALRESPONSE: 8
WONGBAKER_NUMERICALRESPONSE: 10
WONGBAKER_NUMERICALRESPONSE: 4
WONGBAKER_NUMERICALRESPONSE: 10

## 2024-02-19 ASSESSMENT — PAIN DESCRIPTION - LOCATION
LOCATION: LEG

## 2024-02-19 ASSESSMENT — PAIN DESCRIPTION - DESCRIPTORS
DESCRIPTORS: ACHING;DISCOMFORT
DESCRIPTORS: ACHING;CRUSHING

## 2024-02-19 NOTE — BRIEF OP NOTE
Brief Postoperative Note      Patient: Jackson Martínez  YOB: 1950  MRN: [id number]    Date of Procedure: 2/19/2024    Pre-Op Diagnosis Codes:     * Gangrene (HCC) [I96]    Post-Op Diagnosis: Same       Procedure(s):  RIGHT BELOW THE KNEE AMPUTATION WITH IMMEDIATE POST OP PROSTHESIS    Surgeon(s):  Agus Montalvo MD    Assistant:  Surgical Assistant: Slim Díaz    Anesthesia: General    Estimated Blood Loss (mL): Minimal    Complications: None    Specimens:   ID Type Source Tests Collected by Time Destination   A : RIGHT LOWER LEG Tissue Tissue SURGICAL PATHOLOGY Agus Montalvo MD 2/19/2024 1157        Implants:  * No implants in log *      Drains: * No LDAs found *          Electronically signed by Agus Montalvo MD on 2/19/2024 at 12:20 PM

## 2024-02-19 NOTE — PROGRESS NOTES
Patient arrived to PACU bay 4, phase one initiated. Placed on bedside monitor, VSS. Report obtained from OR RN and anesthesia. Patient on room air. See flowsheets for assessment. Side rails in place, will monitor patient closely.

## 2024-02-19 NOTE — PROGRESS NOTES
[x]No    ------------------------------------------------------------------------------------------------------------------------------------------------------------------------    MDM      [x] High (any 2)    A. Problems (any 1)  [x] Acute/Chronic Illness/injury posing threat to life or bodily function:    [] Severe exacerbation of chronic illness:    ---------------------------------------------------------------------  B. Risk of Treatment (any 1)   [] Drugs/treatments that require intensive monitoring for toxicity include:    [] Change in code status:    [] Decision to escalate care:    [] Major surgery/procedure with associated risk factors:    ----------------------------------------------------------------------  C. Data (any 2)  [x] Discussed current management and discharge planning options with Case Management.  [] Discussed management of the case with:    [] Telemetry personally reviewed and interpreted as documented above    [] Imaging personally reviewed and interpreted, includes:    [x] Data Review (any 3)  [] Collateral history obtained from:    [x] All available Consultant notes from yesterday/today were reviewed  [x] All current labs were reviewed and interpreted for clinical significance   [x] Appropriate follow-up labs were ordered    Medications:  Personally reviewed in detail in conjunction w/ labs as documented for evidence of drug toxicity.     Infusion Medications    sodium chloride 20 mL/hr at 02/13/24 1004    dextrose       Scheduled Medications    ceFAZolin        sodium chloride flush  5-40 mL IntraVENous 2 times per day    nicotine  1 patch TransDERmal Daily    fluticasone  1 spray Nasal Daily    mometasone-formoterol  2 puff Inhalation BID RT    insulin lispro  0-4 Units SubCUTAneous TID WC    insulin lispro  0-4 Units SubCUTAneous Nightly     PRN Meds: ceFAZolin, albuterol sulfate HFA, ipratropium 0.5 mg-albuterol 2.5 mg, oxyCODONE-acetaminophen, morphine, sodium chloride flush, sodium

## 2024-02-19 NOTE — ANESTHESIA POSTPROCEDURE EVALUATION
Department of Anesthesiology  Postprocedure Note    Patient: Jackson Martínez  MRN: [id number]  YOB: 1950  Date of evaluation: 2/19/2024    Procedure Summary       Date: 02/19/24 Room / Location: [place] [address] (HYBRID) / [facility]    Anesthesia Start: 1122 Anesthesia Stop: 1240    Procedure: RIGHT BELOW THE KNEE AMPUTATION WITH IMMEDIATE POST OP PROSTHESIS (Right: Leg Lower) Diagnosis:       Gangrene (HCC)      (Gangrene (HCC) [I96])    Surgeons: Agus Montalvo MD Responsible Provider: Lb Ureña MD    Anesthesia Type: general ASA Status: 3            Anesthesia Type: No value filed.    Enedina Phase I: Enedina Score: 10    Enedina Phase II:      Anesthesia Post Evaluation    Comments: Anes Post-op Note    Name:    Jackson Martínez  MRN:      [id number]    Patient Vitals in the past 12 hrs:  02/19/24 1356, BP:(!) 157/87, Pulse:83, Resp:20, SpO2:92 %  02/19/24 1330, BP:(!) 164/87, Pulse:85, Resp:26, SpO2:90 %  02/19/24 1315, BP:(!) 166/95, Pulse:82, Resp:25, SpO2:94 %  02/19/24 1310, BP:(!) 160/92, Pulse:84, Resp:(!) 31, SpO2:95 %  02/19/24 1305, BP:(!) 174/89, Temp:98.3 °F (36.8 °C), Temp src:Temporal, Pulse:83, Resp:20, SpO2:96 %  02/19/24 1302, Resp:20  02/19/24 1300, BP:(!) 169/136, Pulse:85, Resp:20, SpO2:93 %  02/19/24 1255, Resp:24  02/19/24 1250, Pulse:82, Resp:19, SpO2:100 %  02/19/24 1245, BP:(!) 171/97, Pulse:81, Resp:30, SpO2:96 %  02/19/24 1240, BP:(!) 170/84, Temp:98.3 °F (36.8 °C), Temp src:Temporal, Pulse:76, Resp:17, SpO2:95 %  02/19/24 0958, BP:138/63, Temp:97.6 °F (36.4 °C), Temp src:Temporal, Pulse:75, Resp:16, SpO2:96 %  02/19/24 0815, BP:102/68, Temp:99.4 °F (37.4 °C), Temp src:Oral, Pulse:67, Resp:18, SpO2:98 %  02/19/24 0458, BP:115/64, Temp:98 °F (36.7 °C), Temp src:Oral, Pulse:63, Resp:18, SpO2:97 %     LABS:    CBC  Lab Results       Component                Value               Date/Time                  WBC                      6.7                  02/17/2024 06:49 AM        HGB                      8.9 (L)             02/17/2024 06:49 AM        HCT                      26.8 (L)            02/17/2024 06:49 AM        PLT                      840 (H)             02/17/2024 06:49 AM   RENAL  Lab Results       Component                Value               Date/Time                  NA                       138                 02/17/2024 06:49 AM        K                        4.0                 02/17/2024 06:49 AM        CL                       105                 02/17/2024 06:49 AM        CO2                      27                  02/17/2024 06:49 AM        BUN                      13                  02/17/2024 06:49 AM        CREATININE               <0.5 (L)            02/17/2024 06:49 AM        GLUCOSE                  139 (H)             02/17/2024 06:49 AM   COAGS  No results found for: \"PROTIME\", \"INR\", \"APTT\"    Intake & Output:  In: 1104 [P.O.:1104]  Out: 1275 [Urine:1225]    Nausea & Vomiting:  No    Level of Consciousness:  Awake    Pain Assessment:  Adequate analgesia    Anesthesia Complications:  No apparent anesthetic complications    SUMMARY      Vital signs stable  OK to discharge from Stage I post anesthesia care.  Care transferred from Anesthesiology department on discharge from perioperative area       No notable events documented.

## 2024-02-19 NOTE — PROGRESS NOTES
Physical Therapy  Signing off at this time. Spoke to RN to confirm pt is undergoing R BKA today. Will need new orders for therapy and updated weight bearing precautions.    Sophia Rizvi, PT  Sandra Adamson, OTR/L

## 2024-02-19 NOTE — CARE COORDINATION
Chart reviewed day 10. Care provided by vascular, podiatry, ID and IM. Pt is from home with his wife. He was IPTA. Pt had a R BKA today with IPOP. Pt has recs for SNF and is [facility]. Referral was made. Will continue to follow course for needs. Katharine Ba RN

## 2024-02-19 NOTE — PLAN OF CARE
Problem: Skin/Tissue Integrity - Adult  Goal: Skin integrity remains intact  Outcome: Progressing  Flowsheets (Taken 2/19/2024 1816)  Skin Integrity Remains Intact: Monitor for areas of redness and/or skin breakdown     Problem: Musculoskeletal - Adult  Goal: Return mobility to safest level of function  Outcome: Progressing  Flowsheets (Taken 2/19/2024 1816)  Return Mobility to Safest Level of Function:   Assess patient stability and activity tolerance for standing, transferring and ambulating with or without assistive devices   Assist with transfers and ambulation using safe patient handling equipment as needed   Obtain physical therapy/occupational therapy consults as needed     Problem: Genitourinary - Adult  Goal: Absence of urinary retention  2/19/2024 1816 by Shawna Bhandari RN  Outcome: Progressing  Flowsheets (Taken 2/19/2024 1816)  Absence of urinary retention:   Assess patient’s ability to void and empty bladder   Monitor intake/output and perform bladder scan as needed     Problem: Chronic Conditions and Co-morbidities  Goal: Patient's chronic conditions and co-morbidity symptoms are monitored and maintained or improved  Outcome: Progressing  Flowsheets (Taken 2/19/2024 1816)  Care Plan - Patient's Chronic Conditions and Co-Morbidity Symptoms are Monitored and Maintained or Improved: Monitor and assess patient's chronic conditions and comorbid symptoms for stability, deterioration, or improvement

## 2024-02-19 NOTE — PROGRESS NOTES
Patient refused getting his weight because he said \"I just got comfortable\". Will endorse to day shift.

## 2024-02-19 NOTE — ANESTHESIA PRE PROCEDURE
Department of Anesthesiology  Preprocedure Note       Name:  Jackson Martínez   Age:  73 y.o.  :  1950                                          MRN:  [id number]         Date:  2024      Surgeon: Surgeon(s):  Agus Montalvo MD    Procedure: Procedure(s):  RIGHT BELOW THE KNEE AMPUTATION WITH IMMEDIATE POST OP PROSTHESIS    Medications prior to admission:   Prior to Admission medications    Medication Sig Start Date End Date Taking? Authorizing Provider   fluticasone (FLONASE) 50 MCG/ACT nasal spray 1 spray by Nasal route daily. 3/26/15   Raymon, [place], APRN - CNP   naproxen (NAPROSYN) 500 MG tablet Take 1 tablet by mouth 2 times daily.  Patient not taking: Reported on 2/10/2024 3/26/15   Raymon, Tomorrow, APRN - CNP   traMADol (ULTRAM) 50 MG tablet Take 1 tablet by mouth every 6 hours as needed for Pain. 3/26/15   Raymon, Tomorrow, APRN - CNP   methocarbamol (ROBAXIN) 500 MG tablet Take 1 tablet by mouth 3 times daily. 1/5/15   Orestes Olivarez PA   fluticasone-salmeterol (ADVAIR) 100-50 MCG/DOSE diskus inhaler Inhale 1 puff into the lungs in the morning and 1 puff in the evening.    Provider, MD Griselda   ipratropium-albuterol (DUONEB) 0.5-2.5 (3) MG/3ML SOLN nebulizer solution Inhale 3 mLs into the lungs every 4 hours. 11   Deion Ochoa MD       Current medications:    Current Facility-Administered Medications   Medication Dose Route Frequency Provider Last Rate Last Admin   • lidocaine 1 % injection 1 mL  1 mL IntraDERmal Once PRN Santo Keller MD       • lactated ringers IV soln infusion   IntraVENous Continuous Santo Keller MD       • sodium chloride flush 0.9 % injection 5-40 mL  5-40 mL IntraVENous 2 times per day Santo Keller MD       • sodium chloride flush 0.9 % injection 5-40 mL  5-40 mL IntraVENous PRN Santo Keller MD       • 0.9 % sodium chloride infusion   IntraVENous PRN Santo Keller MD       • midazolam

## 2024-02-19 NOTE — PLAN OF CARE
Problem: Discharge Planning  Goal: Discharge to home or other facility with appropriate resources  2/19/2024 0607 by Sascha Kaur RN  Outcome: Progressing     Problem: Pain  Goal: Verbalizes/displays adequate comfort level or baseline comfort level  2/19/2024 0607 by Sascha Kaur RN  Outcome: Progressing     Problem: Safety - Adult  Goal: Free from fall injury  2/19/2024 0607 by Sascha Kaur RN  Outcome: Progressing     Problem: Skin/Tissue Integrity - Adult  Goal: Incisions, wounds, or drain sites healing without S/S of infection  2/19/2024 0607 by Sascha Kaur RN  Outcome: Progressing     Problem: Genitourinary - Adult  Goal: Absence of urinary retention  2/19/2024 0607 by Sascha Kaur RN  Outcome: Progressing

## 2024-02-20 LAB
ALBUMIN SERPL-MCNC: 2.6 G/DL (ref 3.4–5)
ANION GAP SERPL CALCULATED.3IONS-SCNC: 9 MMOL/L (ref 3–16)
BASOPHILS # BLD: 0.1 K/UL (ref 0–0.2)
BASOPHILS NFR BLD: 0.5 %
BUN SERPL-MCNC: 18 MG/DL (ref 7–20)
CALCIUM SERPL-MCNC: 8.4 MG/DL (ref 8.3–10.6)
CHLORIDE SERPL-SCNC: 99 MMOL/L (ref 99–110)
CO2 SERPL-SCNC: 25 MMOL/L (ref 21–32)
CREAT SERPL-MCNC: 0.6 MG/DL (ref 0.8–1.3)
DEPRECATED RDW RBC AUTO: 15 % (ref 12.4–15.4)
EOSINOPHIL # BLD: 0 K/UL (ref 0–0.6)
EOSINOPHIL NFR BLD: 0.3 %
GFR SERPLBLD CREATININE-BSD FMLA CKD-EPI: >60 ML/MIN/{1.73_M2}
GLUCOSE BLD-MCNC: 135 MG/DL (ref 70–99)
GLUCOSE BLD-MCNC: 160 MG/DL (ref 70–99)
GLUCOSE BLD-MCNC: 189 MG/DL (ref 70–99)
GLUCOSE BLD-MCNC: 218 MG/DL (ref 70–99)
GLUCOSE SERPL-MCNC: 200 MG/DL (ref 70–99)
HCT VFR BLD AUTO: 29.3 % (ref 40.5–52.5)
HGB BLD-MCNC: 9.5 G/DL (ref 13.5–17.5)
LYMPHOCYTES # BLD: 1.5 K/UL (ref 1–5.1)
LYMPHOCYTES NFR BLD: 11 %
MAGNESIUM SERPL-MCNC: 2 MG/DL (ref 1.8–2.4)
MCH RBC QN AUTO: 29.2 PG (ref 26–34)
MCHC RBC AUTO-ENTMCNC: 32.3 G/DL (ref 31–36)
MCV RBC AUTO: 90.2 FL (ref 80–100)
MONOCYTES # BLD: 1.4 K/UL (ref 0–1.3)
MONOCYTES NFR BLD: 10.5 %
NEUTROPHILS # BLD: 10.6 K/UL (ref 1.7–7.7)
NEUTROPHILS NFR BLD: 77.7 %
NT-PROBNP SERPL-MCNC: 2412 PG/ML (ref 0–124)
PERFORMED ON: ABNORMAL
PHOSPHATE SERPL-MCNC: 3.5 MG/DL (ref 2.5–4.9)
PLATELET # BLD AUTO: 785 K/UL (ref 135–450)
PMV BLD AUTO: 6.3 FL (ref 5–10.5)
POTASSIUM SERPL-SCNC: 4.3 MMOL/L (ref 3.5–5.1)
RBC # BLD AUTO: 3.25 M/UL (ref 4.2–5.9)
SODIUM SERPL-SCNC: 133 MMOL/L (ref 136–145)
WBC # BLD AUTO: 13.7 K/UL (ref 4–11)

## 2024-02-20 PROCEDURE — 1200000000 HC SEMI PRIVATE

## 2024-02-20 PROCEDURE — 85025 COMPLETE CBC W/AUTO DIFF WBC: CPT

## 2024-02-20 PROCEDURE — 94640 AIRWAY INHALATION TREATMENT: CPT

## 2024-02-20 PROCEDURE — 97530 THERAPEUTIC ACTIVITIES: CPT

## 2024-02-20 PROCEDURE — 36415 COLL VENOUS BLD VENIPUNCTURE: CPT

## 2024-02-20 PROCEDURE — 83880 ASSAY OF NATRIURETIC PEPTIDE: CPT

## 2024-02-20 PROCEDURE — 97168 OT RE-EVAL EST PLAN CARE: CPT

## 2024-02-20 PROCEDURE — 6370000000 HC RX 637 (ALT 250 FOR IP): Performed by: PODIATRIST

## 2024-02-20 PROCEDURE — 97535 SELF CARE MNGMENT TRAINING: CPT

## 2024-02-20 PROCEDURE — 2580000003 HC RX 258: Performed by: PODIATRIST

## 2024-02-20 PROCEDURE — 80069 RENAL FUNCTION PANEL: CPT

## 2024-02-20 PROCEDURE — 83735 ASSAY OF MAGNESIUM: CPT

## 2024-02-20 PROCEDURE — 6360000002 HC RX W HCPCS: Performed by: PODIATRIST

## 2024-02-20 PROCEDURE — 97164 PT RE-EVAL EST PLAN CARE: CPT

## 2024-02-20 RX ADMIN — MORPHINE SULFATE 4 MG: 4 INJECTION, SOLUTION INTRAMUSCULAR; INTRAVENOUS at 18:18

## 2024-02-20 RX ADMIN — OXYCODONE AND ACETAMINOPHEN 1 TABLET: 7.5; 325 TABLET ORAL at 16:08

## 2024-02-20 RX ADMIN — MORPHINE SULFATE 4 MG: 4 INJECTION, SOLUTION INTRAMUSCULAR; INTRAVENOUS at 23:25

## 2024-02-20 RX ADMIN — SODIUM CHLORIDE, PRESERVATIVE FREE 10 ML: 5 INJECTION INTRAVENOUS at 19:29

## 2024-02-20 RX ADMIN — SODIUM CHLORIDE, PRESERVATIVE FREE 10 ML: 5 INJECTION INTRAVENOUS at 07:42

## 2024-02-20 RX ADMIN — Medication 2 PUFF: at 08:33

## 2024-02-20 RX ADMIN — Medication 2 PUFF: at 08:35

## 2024-02-20 RX ADMIN — MORPHINE SULFATE 4 MG: 4 INJECTION, SOLUTION INTRAMUSCULAR; INTRAVENOUS at 08:44

## 2024-02-20 RX ADMIN — OXYCODONE AND ACETAMINOPHEN 1 TABLET: 7.5; 325 TABLET ORAL at 07:42

## 2024-02-20 RX ADMIN — OXYCODONE AND ACETAMINOPHEN 1 TABLET: 7.5; 325 TABLET ORAL at 11:52

## 2024-02-20 RX ADMIN — MORPHINE SULFATE 4 MG: 4 INJECTION, SOLUTION INTRAMUSCULAR; INTRAVENOUS at 00:25

## 2024-02-20 RX ADMIN — OXYCODONE AND ACETAMINOPHEN 1 TABLET: 7.5; 325 TABLET ORAL at 20:28

## 2024-02-20 RX ADMIN — Medication 2 PUFF: at 20:04

## 2024-02-20 RX ADMIN — MORPHINE SULFATE 4 MG: 4 INJECTION, SOLUTION INTRAMUSCULAR; INTRAVENOUS at 04:28

## 2024-02-20 RX ADMIN — MORPHINE SULFATE 4 MG: 4 INJECTION, SOLUTION INTRAMUSCULAR; INTRAVENOUS at 12:57

## 2024-02-20 RX ADMIN — OXYCODONE AND ACETAMINOPHEN 1 TABLET: 7.5; 325 TABLET ORAL at 03:29

## 2024-02-20 ASSESSMENT — PAIN DESCRIPTION - LOCATION
LOCATION: LEG

## 2024-02-20 ASSESSMENT — PAIN DESCRIPTION - ORIENTATION
ORIENTATION: RIGHT

## 2024-02-20 ASSESSMENT — PAIN SCALES - GENERAL
PAINLEVEL_OUTOF10: 9
PAINLEVEL_OUTOF10: 9
PAINLEVEL_OUTOF10: 8
PAINLEVEL_OUTOF10: 9
PAINLEVEL_OUTOF10: 7
PAINLEVEL_OUTOF10: 10
PAINLEVEL_OUTOF10: 8

## 2024-02-20 ASSESSMENT — PAIN DESCRIPTION - DESCRIPTORS: DESCRIPTORS: ACHING;DISCOMFORT

## 2024-02-20 NOTE — PROGRESS NOTES
Occupational Therapy  Facility/Department: [place] [id number] - MED SURG  Occupational Therapy Re-evaluation and Treatment Note     Name: Jackson Martínez  : 1950  MRN: [id number]  Date of Service: 2024    Discharge Recommendations:  Subacute/Skilled Nursing Facility   Therapy discharge recommendations are subject to collaboration from the patient’s interdisciplinary healthcare team, including MD and case management recommendations.    Barriers to Home Discharge:   [x] Steps to access home entry or bed/bath:   [x] Unable to transfer, ambulate, or propel wheelchair household distances without assist   [x] Limited available assist at home upon discharge    [] Patient or family requests d/c to post-acute facility    [] Poor cognition/safety awareness for d/c to home alone    [] Unable to maintain ordered weight bearing status    [] Patient with salient signs of long-standing immobility   [x] Decreased independence with ADLs   [x] Increased risk for falls   [] Other:  If pt is unable to be seen after this session, please let this note serve as discharge summary.  Please see case management note for discharge disposition.  Thank you.    Patient Diagnosis(es): The primary encounter diagnosis was Severe sepsis (HCC). Diagnoses of Osteomyelitis of third toe of right foot (HCC), Cellulitis of right foot, Gangrene of right foot (HCC), and Gangrene (HCC) were also pertinent to this visit.  Past Medical History:  has a past medical history of Bronchitis.  Past Surgical History:  has a past surgical history that includes Splenectomy; Nasal septum surgery; Tonsillectomy; Adenoidectomy; Foot Debridement (Right, 2024); Foot surgery (Right, 2024); and Leg amputation below knee (Right, 2024).       Assessment   Performance deficits / Impairments: Decreased functional mobility ;Decreased endurance;Decreased ADL status;Decreased balance;Decreased strength;Decreased safe awareness;Decreased cognition;Decreased  Present: No  Referring Practitioner: BIPIN Montalvo  Diagnosis: Gangrene R foot s/p R BKA 2/19/24  Subjective  Subjective: Pt resting in bed, needed encouragement to participate in therapy re-eval and tx.  General Comment  Comments: RN approved therapy  8/10 RLE pain  Social/Functional History  Social/Functional History  Lives With: Spouse  Type of Home: Apartment  Home Layout: One level  Home Access: Stairs to enter without rails, Stairs to enter with rails  Entrance Stairs - Number of Steps: 4  Entrance Stairs - Rails: Both  Bathroom Shower/Tub: Tub only  Bathroom Toilet: Standard  Bathroom Equipment: Shower chair  Bathroom Accessibility: Walker accessible  Home Equipment: Walker, rolling, Crutches, Cane, quad, Reacher  Has the patient had two or more falls in the past year or any fall with injury in the past year?: Yes (carrying groceries down steps, once)  ADL Assistance: Independent  Homemaking Assistance: Independent  Homemaking Responsibilities: Yes  Ambulation Assistance: Independent (reports furntiture surfing)  Transfer Assistance: Independent  Active : No  Patient's  Info: wife drives  Leisure & Hobbies: hand      Objective   Pulse: 85  Heart Rate Source: Monitor  BP: (!) 153/88  BP Location: Right upper arm  BP Method: Automatic  Patient Position: Semi fowlers  MAP (Calculated): 110  Respirations: 16  SpO2: 95 %  O2 Device: None (Room air)  Temp: 98.3 °F (36.8 °C)           Safety Devices  Type of Devices: All berry prominences offloaded;Left in bed;Call light within reach;Bed alarm in place;Nurse notified;All fall risk precautions in place  Bed Mobility Training  Bed Mobility Training: Yes  Interventions: Verbal cues;Manual cues  Rolling: Moderate assistance;Assist X1 (Used bedrails)  Supine to Sit: Moderate assistance;Assist X2 (Pt partially sat EOB with mod x2 and max cues to initiate activity.)  Sit to Supine: Moderate assistance;Assist X2 (to return to supine)  Scooting: Moderate

## 2024-02-20 NOTE — PROGRESS NOTES
Hospital Medicine Progress Note      Date of Admission: 2/9/2024  Hospital Day: 12      Chief Admission Complaint:  rt foot pain     Subjective:  resting in bed,  cast rep at bedside, no issues, no  overnight events, pain controlled this morning     Presenting Admission History:          Jackson Martínez is a 73 y.o. male with pmh of tobacco abuse who presents with ongoing right foot pain with wound and drainage, found to be in severe sepsis.      In the ER, patient was hypertensive on arrival, tachycardic 110s. XR right foot on arrival showing bony resorption of 3rd toe suspicious for osteomyelitis with soft tissue swelling and neg soft tissue gas. Labwork significant for Lactate 2.8, .8, , leukocytosis of 29.4, Hgb of 9.2. Received sepsis bolus, IV Vanc and Zosyn x 1. Patient admitted to the floor for continuous monitoring, IV meds and fluids, specialty evaluation.      Patient states his symptoms started 2 weeks ago when he cut his right third toenail with a clipper and developed a wound. Has had progressively worsening pain, drainage, bleeding, and difficulty bearing weight on his right foot due to the pain.  Did not seek care at an urgent care.  Has only taken Tylenol or Advil at home as needed for pain.  He denies prior history of similar wounds, diabetes, neuropathy, history of hypertension or hyperlipidemia. Mentions he had a \"pain pill problem\" years ago.  He denies recent fever, chills, chest pain, SOB, abdominal pain, nausea or vomiting, diarrhea constipation, hematuria or dysuria, bloody stools, headaches, dizziness, acute vision or hearing changes, LOC, numbness or tingling.        Assessment/Plan:       Current Principal Problem:  Sepsis (HCC)       Sepsis secondary osteomyelitis  X-ray of foot was consistent with osteomyelitis.  Podiatry and ID were consulted, apprec recs and mgmt  Follow-up MRI for surgical planning(noted severe infectious change, multifocal OM/septic arthritis,

## 2024-02-20 NOTE — PLAN OF CARE
Problem: Discharge Planning  Goal: Discharge to home or other facility with appropriate resources  Outcome: Progressing     Problem: Pain  Goal: Verbalizes/displays adequate comfort level or baseline comfort level  Outcome: Progressing     Problem: Skin/Tissue Integrity  Goal: Absence of new skin breakdown  Outcome: Progressing     Problem: Safety - Adult  Goal: Free from fall injury  Outcome: Progressing     Problem: ABCDS Injury Assessment  Goal: Absence of physical injury  Outcome: Progressing     Problem: Skin/Tissue Integrity - Adult  Goal: Skin integrity remains intact  Outcome: Progressing    Goal: Incisions, wounds, or drain sites healing without S/S of infection  Outcome: Progressing     Problem: Musculoskeletal - Adult  Goal: Return mobility to safest level of function  Outcome: Progressing     Problem: Genitourinary - Adult  Goal: Absence of urinary retention  Outcome: Progressing     Problem: Infection - Adult  Goal: Absence of infection at discharge  Outcome: Progressing  Goal: Absence of infection during hospitalization  Outcome: Progressing     Problem: Metabolic/Fluid and Electrolytes - Adult  Goal: Glucose maintained within prescribed range  Outcome: Progressing     Problem: Hematologic - Adult  Goal: Maintains hematologic stability  Outcome: Progressing     Problem: Chronic Conditions and Co-morbidities  Goal: Patient's chronic conditions and co-morbidity symptoms are monitored and maintained or improved  Outcome: Progressing       Problem: Behavior  Goal: Pt/Family maintain appropriate behavior and adhere to behavioral management agreement, if implemented  Outcome: Progressing     Problem: Nutrition Deficit:  Goal: Optimize nutritional status  Outcome: Progressing

## 2024-02-20 NOTE — PROGRESS NOTES
On arrival to administer PRN pain medication, this RN found IV site to be infiltrated. IV was removed and dressing applied. Attempts to gain further IV access unsuccessful at this time. Patient's RN Sonja notified of failed IV access, and was given pt's full Morphine 4 mg syringe by this RN. Pt eating his dinner at this time.

## 2024-02-20 NOTE — PROGRESS NOTES
Physical Therapy  Facility/Department: [place] [id number] - MED SURG  Physical Therapy Re-Evaluation    Name: Jackson Martínez  : 1950  MRN: [id number]  Date of Service: 2024    Discharge Recommendations:  Subacute/Skilled Nursing Facility   PT Equipment Recommendations  Equipment Needed: No      Therapy discharge recommendations take into account each patient's current medical complexities and are subject to input/oversight from the patient's healthcare team.   Barriers to Home Discharge:   [x] Steps to access home entry or bed/bath: 4 ARGELIA   [x] Unable to transfer, ambulate, or propel wheelchair household distances without assist   [] Limited available assist at home upon discharge    [] Patient or family requests d/c to post-acute facility    [x] Poor cognition/safety awareness for d/c to home alone    []Unable to maintain ordered weight bearing status    [] Patient with salient signs of long-standing immobility   [x] Patient is at risk for falls   [] Other:    If pt is unable to be seen after this session, please let this note serve as discharge summary.  Please see case management note for discharge disposition.  Thank you.    Patient Diagnosis(es): The primary encounter diagnosis was Severe sepsis (HCC). Diagnoses of Osteomyelitis of third toe of right foot (HCC), Cellulitis of right foot, Gangrene of right foot (HCC), and Gangrene (HCC) were also pertinent to this visit.  Past Medical History:  has a past medical history of Bronchitis.  Past Surgical History:  has a past surgical history that includes Splenectomy; Nasal septum surgery; Tonsillectomy; Adenoidectomy; Foot Debridement (Right, 2024); Foot surgery (Right, 2024); and Leg amputation below knee (Right, 2024).    Assessment   Body Structures, Functions, Activity Limitations Requiring Skilled Therapeutic Intervention: Decreased functional mobility ;Decreased ROM;Decreased cognition;Decreased endurance;Decreased safe awareness;Decreased  No

## 2024-02-20 NOTE — PROGRESS NOTES
Pt called out stating , \"My cast is making my leg really itchy. Can you bring me something to stick down in my cast and scratch it?\" RN Sonja told him \"I cannot bring you anything to stick in your cast to itch your leg with.\" Pt education was provided about the risk of infection when sticking objects and scratching underneath his cast. Pt verbalizes he understood.

## 2024-02-20 NOTE — CARE COORDINATION
Chart reviewed day 11. Care provided by vascular, podiatry, ID and IM. Pt is from home with his wife. He was IPTA. Pt had R BKA yesterday and has recs for SNF. He is agreeable to [facility]. [facility] would like to see updated PT/OT notes. Dr. Montalvo plans to remove cast and inspect the stump on Thursday. Pt is requiring IV pain meds. Will continue to follow course for needs[facility] Will need [facility]. Katharine Ba RN

## 2024-02-20 NOTE — OP NOTE
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    OPERATION PERFORMED:  Right below-knee amputation.    SURGEON:  Agus Montalvo MD    ANESTHESIA  General endotracheal.    INDICATIONS  The patient is a 73-year-old male who presented with severe  osteomyelitis and gangrenous changes of the right foot.  It was  nonsalvageable.  Due to the significant moderate infection, he was taken  to the operating room where a guillotine amputation was performed just  above the ankle.  The patient's sepsis has resolved.  He presents now to  undergo formal below-knee amputation.    OPERATIVE PROCEDURE:  The patient was brought to the operating room and  placed in the supine position.  General endotracheal anesthesia induced.  After adequate anesthesia, the right lower extremity was prepped and  draped in sterile fashion.  Tourniquet was applied on the distal thigh  and inflated to 250 mmHg.  Standard below-knee amputation incision was  then made creating a posterior myocutaneous flap.  Neurovascular bundles  were clamped and ligated using 2-0 Vicryl ties.  Tibia and fibula were  cut with oscillating bone saw and the muscles were divided using  cautery.  The tourniquet was then released.  Small bleeding points were  controlled using cautery.  The posterior flap was then rotated  anteriorly and the fascial edges secured using interrupted 2-0 Vicryl  sutures.   Skin edges reapproximated using skin staples.  Sterile  dressings were then applied and an immediate postoperative prosthesis  was placed by prosthetist from [facility] in [place].  There were no  complications to this procedure.  At the end of procedure, sponge,  needle and instrument counts were correct.  The patient was extubated in  the operating room, transferred to the recovery area in stable  condition.  Estimated blood loss throughout the procedure was less than  100 mL.        CHRIS PARRA MD    D: 02/19/2024 17:00:20       T: 02/19/2024 17:02:51     [email or web address]  Job#: [id number]     Doc#: [id number]    CC:

## 2024-02-20 NOTE — PROGRESS NOTES
IV access was lost prior to PRN morphine IV administration. 4 mg IV syringe was wasted in med room with Lucy JACOBSON.

## 2024-02-20 NOTE — PLAN OF CARE
Problem: Pain  Goal: Verbalizes/displays adequate comfort level or baseline comfort level  Outcome: Not Progressing  Flowsheets (Taken 2/20/2024 1400)  Verbalizes/displays adequate comfort level or baseline comfort level: Encourage patient to monitor pain and request assistance     Problem: Safety - Adult  Goal: Free from fall injury  Outcome: Progressing  Flowsheets (Taken 2/20/2024 1400)  Free From Fall Injury: Instruct family/caregiver on patient safety     Problem: Musculoskeletal - Adult  Goal: Return mobility to safest level of function  Outcome: Progressing  Flowsheets (Taken 2/20/2024 1400)  Return Mobility to Safest Level of Function:   Assess patient stability and activity tolerance for standing, transferring and ambulating with or without assistive devices   Assist with transfers and ambulation using safe patient handling equipment as needed   Obtain physical therapy/occupational therapy consults as needed     Problem: Behavior  Goal: Pt/Family maintain appropriate behavior and adhere to behavioral management agreement, if implemented  Description: INTERVENTIONS:  1. Assess patient/family's coping skills and  non-compliant behavior (including use of illegal substances)  2. Notify security of behavior or suspected illegal substances which indicate the need for search of the family and/or belongings  3. Encourage verbalization of thoughts and concerns in a socially appropriate manner  4. Utilize positive, consistent limit setting strategies supporting safety of patient, staff and others  5. Encourage participation in the decision making process about the behavioral management agreement  6. If a visitor's behavior poses a threat to safety call refer to organization policy.  7. Initiate consult with , Psychosocial CNS, Spiritual Care as appropriate  Outcome: Progressing  Flowsheets (Taken 2/20/2024 1400)  Patient/family maintains appropriate behavior and adheres to behavioral management

## 2024-02-21 LAB
GLUCOSE BLD-MCNC: 145 MG/DL (ref 70–99)
GLUCOSE BLD-MCNC: 165 MG/DL (ref 70–99)
GLUCOSE BLD-MCNC: 248 MG/DL (ref 70–99)
GLUCOSE BLD-MCNC: 314 MG/DL (ref 70–99)
PERFORMED ON: ABNORMAL

## 2024-02-21 PROCEDURE — 1200000000 HC SEMI PRIVATE

## 2024-02-21 PROCEDURE — 97535 SELF CARE MNGMENT TRAINING: CPT

## 2024-02-21 PROCEDURE — 97110 THERAPEUTIC EXERCISES: CPT

## 2024-02-21 PROCEDURE — 2580000003 HC RX 258: Performed by: PODIATRIST

## 2024-02-21 PROCEDURE — 6360000002 HC RX W HCPCS: Performed by: PODIATRIST

## 2024-02-21 PROCEDURE — 97530 THERAPEUTIC ACTIVITIES: CPT

## 2024-02-21 PROCEDURE — 6370000000 HC RX 637 (ALT 250 FOR IP): Performed by: PODIATRIST

## 2024-02-21 PROCEDURE — 94640 AIRWAY INHALATION TREATMENT: CPT

## 2024-02-21 PROCEDURE — 6370000000 HC RX 637 (ALT 250 FOR IP): Performed by: INTERNAL MEDICINE

## 2024-02-21 RX ORDER — KETOROLAC TROMETHAMINE 30 MG/ML
15 INJECTION, SOLUTION INTRAMUSCULAR; INTRAVENOUS EVERY 12 HOURS PRN
Status: DISCONTINUED | OUTPATIENT
Start: 2024-02-21 | End: 2024-02-23 | Stop reason: HOSPADM

## 2024-02-21 RX ORDER — GABAPENTIN 100 MG/1
100 CAPSULE ORAL 3 TIMES DAILY
Status: DISCONTINUED | OUTPATIENT
Start: 2024-02-21 | End: 2024-02-22

## 2024-02-21 RX ADMIN — Medication 2 PUFF: at 20:23

## 2024-02-21 RX ADMIN — OXYCODONE AND ACETAMINOPHEN 1 TABLET: 7.5; 325 TABLET ORAL at 01:43

## 2024-02-21 RX ADMIN — OXYCODONE AND ACETAMINOPHEN 1 TABLET: 7.5; 325 TABLET ORAL at 16:33

## 2024-02-21 RX ADMIN — GABAPENTIN 100 MG: 100 CAPSULE ORAL at 12:12

## 2024-02-21 RX ADMIN — OXYCODONE AND ACETAMINOPHEN 1 TABLET: 7.5; 325 TABLET ORAL at 12:12

## 2024-02-21 RX ADMIN — OXYCODONE AND ACETAMINOPHEN 1 TABLET: 7.5; 325 TABLET ORAL at 05:50

## 2024-02-21 RX ADMIN — SODIUM CHLORIDE, PRESERVATIVE FREE 10 ML: 5 INJECTION INTRAVENOUS at 20:13

## 2024-02-21 RX ADMIN — GABAPENTIN 100 MG: 100 CAPSULE ORAL at 20:13

## 2024-02-21 RX ADMIN — INSULIN LISPRO 3 UNITS: 100 INJECTION, SOLUTION INTRAVENOUS; SUBCUTANEOUS at 12:12

## 2024-02-21 RX ADMIN — Medication 2 PUFF: at 08:35

## 2024-02-21 RX ADMIN — SODIUM CHLORIDE, PRESERVATIVE FREE 10 ML: 5 INJECTION INTRAVENOUS at 08:59

## 2024-02-21 RX ADMIN — MORPHINE SULFATE 4 MG: 4 INJECTION, SOLUTION INTRAMUSCULAR; INTRAVENOUS at 03:40

## 2024-02-21 RX ADMIN — OXYCODONE AND ACETAMINOPHEN 1 TABLET: 7.5; 325 TABLET ORAL at 20:13

## 2024-02-21 RX ADMIN — MORPHINE SULFATE 4 MG: 4 INJECTION, SOLUTION INTRAMUSCULAR; INTRAVENOUS at 08:47

## 2024-02-21 ASSESSMENT — PAIN SCALES - GENERAL
PAINLEVEL_OUTOF10: 8
PAINLEVEL_OUTOF10: 8
PAINLEVEL_OUTOF10: 7
PAINLEVEL_OUTOF10: 8
PAINLEVEL_OUTOF10: 8

## 2024-02-21 ASSESSMENT — PAIN DESCRIPTION - DESCRIPTORS
DESCRIPTORS: ACHING;DISCOMFORT
DESCRIPTORS: ACHING;DISCOMFORT

## 2024-02-21 ASSESSMENT — PAIN DESCRIPTION - LOCATION
LOCATION: LEG

## 2024-02-21 ASSESSMENT — PAIN DESCRIPTION - ORIENTATION
ORIENTATION: RIGHT

## 2024-02-21 ASSESSMENT — PAIN SCALES - WONG BAKER: WONGBAKER_NUMERICALRESPONSE: 4

## 2024-02-21 NOTE — PROGRESS NOTES
Occupational Therapy  Facility/Department: [facility] B3 - MED SURG  Daily Treatment Note  NAME: Jackson Martínez  : 1950  MRN: [id number]    Date of Service: 2024    Discharge Recommendations:  Subacute/Skilled Nursing Facility  OT Equipment Recommendations  Equipment Needed: No  Other: Defer to next level of care    Therapy discharge recommendations are subject to collaboration from the patient’s interdisciplinary healthcare team, including MD and case management recommendations.    Barriers to Home Discharge:   [] Steps to access home entry or bed/bath:   [x] Unable to transfer, ambulate, or propel wheelchair household distances without assist   [x] Limited available assist at home upon discharge    [] Patient or family requests d/c to post-acute facility    [x] Poor cognition/safety awareness for d/c to home alone    [x] Unable to maintain ordered weight bearing status    [] Patient with salient signs of long-standing immobility   [x] Decreased independence with ADLs   [x] Increased risk for falls   [] Other:    If pt is unable to be seen after this session, please let this note serve as discharge summary.  Please see case management note for discharge disposition.  Thank you.    Patient Diagnosis(es): The primary encounter diagnosis was Severe sepsis (HCC). Diagnoses of Osteomyelitis of third toe of right foot (HCC), Cellulitis of right foot, Gangrene of right foot (HCC), and Gangrene (HCC) were also pertinent to this visit.     Assessment    Assessment: Pt tolerated OT/PT cotx fair this date. Co-tx collaboration this date to safely meet goals and will have better occupational performance outcomes with in a co-treatment than 1:1 session. He is limited by phantom pain in RLE. He requires totalA for toileting and LB ADLs, maxAx2 for supine>sit tx, minAx2 for sit>supine tx, and maxA progressing to CGA for sitting balance. He sat on EOB for ~5 min and participated in BUE and BLE therex with fair tolerance,  redirect;Difficulty attending to directions  Safety Judgement: Decreased awareness of need for safety;Decreased awareness of need for assistance  Problem Solving: Decreased awareness of errors  Insights: Decreased awareness of deficits  Initiation: Requires cues for some  Sequencing: Requires cues for some  Cognition Comment: Needed extra vc's to comprehend directions to perform supine to sit (\" Can you tell me that again?.\")        Objective    Vitals  Pulse: 98  BP: 125/75  MAP (Calculated): 92  SpO2: 96 %  O2 Device: None (Room air)       Bed Mobility Training  Bed Mobility Training: Yes  Interventions: Verbal cues;Manual cues  Rolling: Moderate assistance;Assist X1 (R and L for shahriar care and brief change)  Supine to Sit: Maximum assistance;Assist X2;Additional time;Adaptive equipment (partial sit first attempt, fully upright on second attempt and sat EOB 5 minutes)  Sit to Supine: Minimum assistance;Assist X2;Additional time;Adaptive equipment  Scooting: Assist X2 (to HOB)  Duration: 5 (min EOB, max A at first progressing to CGA)  Balance  Sitting: Impaired  Sitting - Static: Fair (occasional)  Sitting - Dynamic: Fair (occasional)  Transfer Training  Transfer Training: No (refused due to pain)       ADL  Feeding: Independent;Beverage management  LE Bathing: Dependent/Total  LE Bathing Skilled Clinical Factors: shahriar hygiene in supine  LE Dressing: Dependent/Total  LE Dressing Skilled Clinical Factors: brief change at bed level  Toileting: Dependent/Total  Toileting Skilled Clinical Factors: incontinent of urine upon arrival, totalA for brief change. Pt requested to use urinal during session and required assistance to position urinal appropriately.  Functional Mobility: None    OT Exercises  Exercise Treatment:   Pt performed x10 reps of the following BUE exercises:  [x]Grasp/release  [x]Wrist flexion/extension  []Forearm pronation/supination   [x]Elbow flexion/extension  []Shoulder flexion/extension  []Chest

## 2024-02-21 NOTE — PLAN OF CARE
Progressing  Flowsheets (Taken 2/20/2024 1920)  Absence of urinary retention: Assess patient’s ability to void and empty bladder     Problem: Infection - Adult  Goal: Absence of infection at discharge  2/21/2024 1040 by Marlen Self RN  Outcome: Progressing  2/20/2024 2153 by Shawna Parks RN  Outcome: Progressing  Flowsheets (Taken 2/20/2024 1920)  Absence of infection at discharge: Assess and monitor for signs and symptoms of infection  Goal: Absence of infection during hospitalization  2/21/2024 1040 by Marlen Self RN  Outcome: Progressing  2/20/2024 2153 by Shawna Parks RN  Outcome: Progressing  Flowsheets (Taken 2/20/2024 1920)  Absence of infection during hospitalization: Assess and monitor for signs and symptoms of infection     Problem: Metabolic/Fluid and Electrolytes - Adult  Goal: Glucose maintained within prescribed range  2/21/2024 1040 by Marlen Self RN  Outcome: Progressing  2/20/2024 2153 by Shawna Parks RN  Outcome: Progressing  Flowsheets (Taken 2/20/2024 1920)  Glucose maintained within prescribed range: Monitor blood glucose as ordered     Problem: Hematologic - Adult  Goal: Maintains hematologic stability  2/21/2024 1040 by Marlen Self RN  Outcome: Progressing  2/20/2024 2153 by Shawna Parks RN  Outcome: Progressing  Flowsheets (Taken 2/20/2024 1920)  Maintains hematologic stability: Assess for signs and symptoms of bleeding or hemorrhage     Problem: Chronic Conditions and Co-morbidities  Goal: Patient's chronic conditions and co-morbidity symptoms are monitored and maintained or improved  2/21/2024 1040 by Marlen Self RN  Outcome: Progressing  2/20/2024 2153 by Shawna Parks RN  Outcome: Progressing  Flowsheets (Taken 2/20/2024 1920)  Care Plan - Patient's Chronic Conditions and Co-Morbidity Symptoms are Monitored and Maintained or Improved: Monitor and assess patient's chronic conditions and comorbid symptoms for stability, deterioration, or  skills and  non-compliant behavior (including use of illegal substances)     Problem: Nutrition Deficit:  Goal: Optimize nutritional status  2/21/2024 1040 by Marlen Self, RN  Outcome: Progressing  2/20/2024 2153 by Shawna Parks, RN  Outcome: Progressing

## 2024-02-21 NOTE — PROGRESS NOTES
02/21/24 1349   Encounter Summary   Encounter Overview/Reason  Initial Encounter   Service Provided For: Patient   Support System Spouse   Last Encounter  02/21/24  (initial visit, Pt stated that he needs rest, no other needs expressed at this time, collab with CM, Pt had an amputation)   Complexity of Encounter Moderate   Begin Time 1110   End Time  1125   Total Time Calculated 15 min

## 2024-02-21 NOTE — PROGRESS NOTES
Physical Therapy  Facility/Department: [facility] B3 - MED SURG  Daily Treatment Note  NAME: Jackson Martínez  : 1950  MRN: [id number]    Date of Service: 2024    Discharge Recommendations:  Subacute/Skilled Nursing Facility   PT Equipment Recommendations  Equipment Needed: No  Other: CTA    Patient Diagnosis(es): The primary encounter diagnosis was Severe sepsis (HCC). Diagnoses of Osteomyelitis of third toe of right foot (HCC), Cellulitis of right foot, Gangrene of right foot (HCC), and Gangrene (HCC) were also pertinent to this visit.    Assessment   Assessment: Pt seen as co treat with OT to safely progress mobility.  Pt progressing slow post BKA on RLE needing max A of 2 supine to sit and min A of 2 sit to supine.  Pt sat EOB 5 min max A progressing to CGA for balance for LLE ther ex and BUE ther ex.  Pt is recommended for con't skilled PT and SNF at D/C.  Activity Tolerance: Patient limited by fatigue;Patient limited by pain;Treatment limited secondary to decreased cognition  Equipment Needed: No  Other: CTA     Plan    Physical Therapy Plan  General Plan: 3-5 times per week  Current Treatment Recommendations: Strengthening;ROM;Balance training;Endurance training;Functional mobility training;Transfer training;Gait training;Cognitive reorientation;Neuromuscular re-education;Home exercise program;Equipment evaluation, education, & procurement;Safety education & training;Patient/Caregiver education & training;Therapeutic activities;Co-Treatment     Restrictions  Restrictions/Precautions  Restrictions/Precautions: Up as Tolerated, Fall Risk  Required Braces or Orthoses?: No  Lower Extremity Weight Bearing Restrictions  Right Lower Extremity Weight Bearing: Non Weight Bearing  Position Activity Restriction  Other position/activity restrictions: RLE IPOP     Subjective    Subjective  Subjective: Agreeable  Pain: 8/10 RLE (\"where my foot used to be\"  Orientation  Overall Orientation Status: Within Functional

## 2024-02-21 NOTE — PROGRESS NOTES
Hospital Medicine Progress Note      Date of Admission: 2/9/2024  Hospital Day: 13      Chief Admission Complaint:  rt foot pain     Subjective:  resting in bed,  no  overnight events, pain appears controlled this morning,      Presenting Admission History:          Jackson Martínez is a 73 y.o. male with pmh of tobacco abuse who presents with ongoing right foot pain with wound and drainage, found to be in severe sepsis.      In the ER, patient was hypertensive on arrival, tachycardic 110s. XR right foot on arrival showing bony resorption of 3rd toe suspicious for osteomyelitis with soft tissue swelling and neg soft tissue gas. Labwork significant for Lactate 2.8, .8, , leukocytosis of 29.4, Hgb of 9.2. Received sepsis bolus, IV Vanc and Zosyn x 1. Patient admitted to the floor for continuous monitoring, IV meds and fluids, specialty evaluation.      Patient states his symptoms started 2 weeks ago when he cut his right third toenail with a clipper and developed a wound. Has had progressively worsening pain, drainage, bleeding, and difficulty bearing weight on his right foot due to the pain.  Did not seek care at an urgent care.  Has only taken Tylenol or Advil at home as needed for pain.  He denies prior history of similar wounds, diabetes, neuropathy, history of hypertension or hyperlipidemia. Mentions he had a \"pain pill problem\" years ago.  He denies recent fever, chills, chest pain, SOB, abdominal pain, nausea or vomiting, diarrhea constipation, hematuria or dysuria, bloody stools, headaches, dizziness, acute vision or hearing changes, LOC, numbness or tingling.        Assessment/Plan:       Current Principal Problem:  Sepsis (HCC)       Sepsis secondary osteomyelitis  X-ray of foot was consistent with osteomyelitis.  Podiatry and ID were consulted, apprec recs and mgmt  Follow-up MRI for surgical planning(noted severe infectious change, multifocal OM/septic arthritis, suspected myonecrosis)  I

## 2024-02-21 NOTE — CARE COORDINATION
Chart reviewed day 12. CAre provided by vascular, podiatry and IM. Pt is from home with his wife. He is IPTA. He had a R BKA Monday and tomorrow Dr. Montalvo will take down the dressing tomorrow and inspect the stump. Pt is still requiring IV pain meds. He has recs for SNF and has been accepted by [facility]. WBC yesterday was 13.7 and Tmax yesterday was 99.5. Pt had to be reminded yesterday not to stick things down his cast to scratch. Pt will need cert. Katharine Ba RN

## 2024-02-22 LAB
GLUCOSE BLD-MCNC: 156 MG/DL (ref 70–99)
GLUCOSE BLD-MCNC: 260 MG/DL (ref 70–99)
GLUCOSE BLD-MCNC: 268 MG/DL (ref 70–99)
GLUCOSE BLD-MCNC: 273 MG/DL (ref 70–99)
PERFORMED ON: ABNORMAL

## 2024-02-22 PROCEDURE — 94640 AIRWAY INHALATION TREATMENT: CPT

## 2024-02-22 PROCEDURE — 6370000000 HC RX 637 (ALT 250 FOR IP): Performed by: INTERNAL MEDICINE

## 2024-02-22 PROCEDURE — 1200000000 HC SEMI PRIVATE

## 2024-02-22 PROCEDURE — 6370000000 HC RX 637 (ALT 250 FOR IP): Performed by: PODIATRIST

## 2024-02-22 PROCEDURE — 2580000003 HC RX 258: Performed by: PODIATRIST

## 2024-02-22 PROCEDURE — 97110 THERAPEUTIC EXERCISES: CPT

## 2024-02-22 PROCEDURE — 94761 N-INVAS EAR/PLS OXIMETRY MLT: CPT

## 2024-02-22 PROCEDURE — 99024 POSTOP FOLLOW-UP VISIT: CPT | Performed by: CLINICAL NURSE SPECIALIST

## 2024-02-22 RX ORDER — OXYCODONE HYDROCHLORIDE AND ACETAMINOPHEN 5; 325 MG/1; MG/1
1 TABLET ORAL EVERY 4 HOURS PRN
Status: DISCONTINUED | OUTPATIENT
Start: 2024-02-22 | End: 2024-02-23 | Stop reason: HOSPADM

## 2024-02-22 RX ORDER — GABAPENTIN 300 MG/1
300 CAPSULE ORAL 3 TIMES DAILY
Status: DISCONTINUED | OUTPATIENT
Start: 2024-02-22 | End: 2024-02-23 | Stop reason: HOSPADM

## 2024-02-22 RX ORDER — OXYCODONE HYDROCHLORIDE AND ACETAMINOPHEN 5; 325 MG/1; MG/1
2 TABLET ORAL EVERY 4 HOURS PRN
Status: DISCONTINUED | OUTPATIENT
Start: 2024-02-22 | End: 2024-02-23 | Stop reason: HOSPADM

## 2024-02-22 RX ADMIN — GABAPENTIN 100 MG: 100 CAPSULE ORAL at 14:00

## 2024-02-22 RX ADMIN — OXYCODONE AND ACETAMINOPHEN 1 TABLET: 7.5; 325 TABLET ORAL at 04:07

## 2024-02-22 RX ADMIN — OXYCODONE AND ACETAMINOPHEN 1 TABLET: 7.5; 325 TABLET ORAL at 12:40

## 2024-02-22 RX ADMIN — OXYCODONE AND ACETAMINOPHEN 1 TABLET: 7.5; 325 TABLET ORAL at 00:06

## 2024-02-22 RX ADMIN — SODIUM CHLORIDE, PRESERVATIVE FREE 10 ML: 5 INJECTION INTRAVENOUS at 08:23

## 2024-02-22 RX ADMIN — INSULIN LISPRO 2 UNITS: 100 INJECTION, SOLUTION INTRAVENOUS; SUBCUTANEOUS at 18:04

## 2024-02-22 RX ADMIN — INSULIN LISPRO 2 UNITS: 100 INJECTION, SOLUTION INTRAVENOUS; SUBCUTANEOUS at 12:39

## 2024-02-22 RX ADMIN — SODIUM CHLORIDE, PRESERVATIVE FREE 10 ML: 5 INJECTION INTRAVENOUS at 20:47

## 2024-02-22 RX ADMIN — OXYCODONE AND ACETAMINOPHEN 1 TABLET: 7.5; 325 TABLET ORAL at 08:18

## 2024-02-22 RX ADMIN — GABAPENTIN 300 MG: 300 CAPSULE ORAL at 20:47

## 2024-02-22 RX ADMIN — OXYCODONE AND ACETAMINOPHEN 2 TABLET: 5; 325 TABLET ORAL at 16:58

## 2024-02-22 RX ADMIN — Medication 2 PUFF: at 16:46

## 2024-02-22 RX ADMIN — OXYCODONE AND ACETAMINOPHEN 2 TABLET: 5; 325 TABLET ORAL at 20:46

## 2024-02-22 RX ADMIN — Medication 2 PUFF: at 08:27

## 2024-02-22 RX ADMIN — GABAPENTIN 100 MG: 100 CAPSULE ORAL at 08:18

## 2024-02-22 ASSESSMENT — PAIN SCALES - GENERAL
PAINLEVEL_OUTOF10: 8
PAINLEVEL_OUTOF10: 9
PAINLEVEL_OUTOF10: 8

## 2024-02-22 ASSESSMENT — PAIN DESCRIPTION - LOCATION
LOCATION: LEG

## 2024-02-22 ASSESSMENT — PAIN DESCRIPTION - ORIENTATION
ORIENTATION: RIGHT

## 2024-02-22 NOTE — PROGRESS NOTES
Vascular Surgery Progress Note      POD#  3  S/P Right below knee amputation    Chief Complaint: Postop follow up      SUBJECTIVE:  has no new c omplaints    OBJECTIVE    Physical  CURRENT VITALS:  BP (!) 142/87   Pulse 96   Temp 98 °F (36.7 °C) (Oral)   Resp 18   Ht 1.753 m (5' 9\")   Wt 68 kg (150 lb)   SpO2 96%   BMI 22.15 kg/m²   24 HR INTAKE/OUTPUT:    Intake/Output Summary (Last 24 hours) at 2/22/2024 1000  Last data filed at 2/22/2024 0859  Gross per 24 hour   Intake 942 ml   Output 1190 ml   Net -248 ml     UO:  250-400-540  ml /shift     Stump dressing changed today  Wound clean, dry and intact, no necrosis or erythema  Kerlix removed. IPOP reapplied       Rt BKA stump    Current Inpatient Medications  Current Facility-Administered Medications: gabapentin (NEURONTIN) capsule 100 mg, 100 mg, Oral, TID  ketorolac (TORADOL) injection 15 mg, 15 mg, IntraVENous, Q12H PRN  albuterol sulfate HFA (PROVENTIL;VENTOLIN;PROAIR) 108 (90 Base) MCG/ACT inhaler 2 puff, 2 puff, Inhalation, Q6H PRN  ipratropium 0.5 mg-albuterol 2.5 mg (DUONEB) nebulizer solution 1 Dose, 1 Dose, Inhalation, Q4H PRN  oxyCODONE-acetaminophen (PERCOCET) 7.5-325 MG per tablet 1 tablet, 1 tablet, Oral, Q4H PRN  sodium chloride flush 0.9 % injection 5-40 mL, 5-40 mL, IntraVENous, 2 times per day  sodium chloride flush 0.9 % injection 5-40 mL, 5-40 mL, IntraVENous, PRN  0.9 % sodium chloride infusion, , IntraVENous, PRN  ondansetron (ZOFRAN-ODT) disintegrating tablet 4 mg, 4 mg, Oral, Q8H PRN **OR** ondansetron (ZOFRAN) injection 4 mg, 4 mg, IntraVENous, Q6H PRN  polyethylene glycol (GLYCOLAX) packet 17 g, 17 g, Oral, Daily PRN  acetaminophen (TYLENOL) tablet 650 mg, 650 mg, Oral, Q6H PRN **OR** acetaminophen (TYLENOL) suppository 650 mg, 650 mg, Rectal, Q6H PRN  nicotine (NICODERM CQ) 21 MG/24HR 1 patch, 1 patch, TransDERmal, Daily  fluticasone (FLONASE) 50 MCG/ACT nasal spray 1 spray, 1 spray, Nasal, Daily  mometasone-formoterol (DULERA)

## 2024-02-22 NOTE — PLAN OF CARE
(Taken 2/22/2024 0823)  Return Mobility to Safest Level of Function: Assess patient stability and activity tolerance for standing, transferring and ambulating with or without assistive devices  2/22/2024 1015 by Verónica Hernandez RN  Outcome: Progressing  Flowsheets (Taken 2/22/2024 0823)  Return Mobility to Safest Level of Function: Assess patient stability and activity tolerance for standing, transferring and ambulating with or without assistive devices     Problem: Genitourinary - Adult  Goal: Absence of urinary retention  2/22/2024 1015 by Verónica Hernandez RN  Outcome: Progressing  Flowsheets (Taken 2/22/2024 0823)  Absence of urinary retention: Assess patient’s ability to void and empty bladder  2/22/2024 1015 by Verónica Hernandez RN  Outcome: Progressing  Flowsheets (Taken 2/22/2024 0823)  Absence of urinary retention: Assess patient’s ability to void and empty bladder     Problem: Infection - Adult  Goal: Absence of infection at discharge  2/22/2024 1015 by Verónica Hernandez RN  Outcome: Progressing  Flowsheets (Taken 2/22/2024 0823)  Absence of infection at discharge: Assess and monitor for signs and symptoms of infection  2/22/2024 1015 by Verónica Hernandez RN  Outcome: Progressing  Flowsheets (Taken 2/22/2024 0823)  Absence of infection at discharge: Assess and monitor for signs and symptoms of infection  Goal: Absence of infection during hospitalization  2/22/2024 1015 by Verónica Hernandez RN  Outcome: Progressing  Flowsheets (Taken 2/22/2024 0823)  Absence of infection during hospitalization: Assess and monitor for signs and symptoms of infection  2/22/2024 1015 by Verónica Hernandez RN  Outcome: Progressing  Flowsheets (Taken 2/22/2024 0823)  Absence of infection during hospitalization: Assess and monitor for signs and symptoms of infection     Problem: Metabolic/Fluid and Electrolytes - Adult  Goal: Glucose maintained within prescribed range  2/22/2024 1015 by Verónica Hernandez RN  Outcome: Progressing  Flowsheets (Taken 2/22/2024

## 2024-02-22 NOTE — PROGRESS NOTES
overnight: []Yes  [x]No    ------------------------------------------------------------------------------------------------------------------------------------------------------------------------    MDM      [x] High (any 2)    A. Problems (any 1)  [x] Acute/Chronic Illness/injury posing threat to life or bodily function:    [] Severe exacerbation of chronic illness:    ---------------------------------------------------------------------  B. Risk of Treatment (any 1)   [] Drugs/treatments that require intensive monitoring for toxicity include:    [] Change in code status:    [] Decision to escalate care:    [] Major surgery/procedure with associated risk factors:    ----------------------------------------------------------------------  C. Data (any 2)  [x] Discussed current management and discharge planning options with Case Management.  [] Discussed management of the case with:    [] Telemetry personally reviewed and interpreted as documented above    [] Imaging personally reviewed and interpreted, includes:    [x] Data Review (any 3)  [] Collateral history obtained from:    [x] All available Consultant notes from yesterday/today were reviewed  [x] All current labs were reviewed and interpreted for clinical significance   [x] Appropriate follow-up labs were ordered    Medications:  Personally reviewed in detail in conjunction w/ labs as documented for evidence of drug toxicity.     Infusion Medications    sodium chloride 20 mL/hr at 02/13/24 1004    dextrose       Scheduled Medications    gabapentin  100 mg Oral TID    sodium chloride flush  5-40 mL IntraVENous 2 times per day    nicotine  1 patch TransDERmal Daily    fluticasone  1 spray Nasal Daily    mometasone-formoterol  2 puff Inhalation BID RT    insulin lispro  0-4 Units SubCUTAneous TID WC    insulin lispro  0-4 Units SubCUTAneous Nightly     PRN Meds: ketorolac, albuterol sulfate HFA, ipratropium 0.5 mg-albuterol 2.5 mg, oxyCODONE-acetaminophen, sodium  chloride flush, sodium chloride, ondansetron **OR** ondansetron, polyethylene glycol, acetaminophen **OR** acetaminophen, glucose, dextrose bolus **OR** dextrose bolus, glucagon (rDNA), dextrose     Labs:  Personally reviewed and interpreted for clinical significance.     Recent Labs     02/20/24  1045   WBC 13.7*   HGB 9.5*   HCT 29.3*   *       Recent Labs     02/20/24  1045   *   K 4.3   CL 99   CO2 25   BUN 18   CREATININE 0.6*   CALCIUM 8.4   MG 2.00   PHOS 3.5       Recent Labs     02/20/24  1045   PROBNP 2,412*       No results for input(s): \"LABA1C\" in the last 72 hours.  No results for input(s): \"AST\", \"ALT\", \"BILIDIR\", \"BILITOT\", \"ALKPHOS\" in the last 72 hours.  No results for input(s): \"INR\", \"LACTA\", \"TSH\" in the last 72 hours.    Urine Cultures: No results found for: \"LABURIN\"  Blood Cultures:   Lab Results   Component Value Date/Time    BC No Growth after 4 days of incubation. 02/09/2024 11:35 PM     Lab Results   Component Value Date/Time    BLOODCULT2 No Growth after 4 days of incubation. 02/09/2024 11:40 PM     Organism:   Lab Results   Component Value Date/Time    ORG Staphylococcus aureus 02/12/2024 10:37 PM         Lang Finney MD

## 2024-02-22 NOTE — DISCHARGE INSTRUCTIONS
Change stump dressing every 3 days.  Keep IPOP on at all times other than for dressing changes  Follow up to see Dr Montalvo 3/15/2024 at 10:45 AM

## 2024-02-22 NOTE — PROGRESS NOTES
Comprehensive Nutrition Assessment    Type and Reason for Visit:  Reassess    Nutrition Recommendations/Plan:   Continue carb controlled diet  Encourage po intakes  Continue Glucerna ONS  Monitor po intakes, nutrition adequacy, weights, pertinent labs, BMs     Malnutrition Assessment:  Malnutrition Status:  At risk for malnutrition (Comment) (02/16/24 1040)      Nutrition Assessment:    Follow up: Pt currently on a carb controlled diet with po intakes % per EMR. Pt reports that his appetite has been good and he has been eating his meals. Pt lethargic and unable to provide much feedback. Pt endorses that he has been drinking his ONS and would like to continue these. Pt denies having any nutrition questions or needs. Will continue current diet and ONS and monitor.    Nutrition Related Findings:    -314 mg/dL x 24 hr. Na 133. BM x1 on 2/21 Wound Type: Diabetic Ulcer, Surgical Incision       Current Nutrition Intake & Therapies:    Average Meal Intake: %  Average Supplements Intake: %  ADULT DIET; Regular; 5 carb choices (75 gm/meal)  ADULT ORAL NUTRITION SUPPLEMENT; Lunch; Diabetic Oral Supplement    Anthropometric Measures:  Height: 175.3 cm (5' 9\")  Ideal Body Weight (IBW): 160 lbs (73 kg)       Current Body Weight: 68 kg (150 lb), 93.8 % IBW. Weight Source: Stated  Current BMI (kg/m2): 22.1        Weight Adjustment For: Amputation  Total Adjusted Percentage (Calculated): 1.5  Adjusted Ideal Body Weight (lbs) (Calculated): 157.6 lbs  Adjusted Ideal Body Weight (kg) (Calculated): 71.64 kg  Adjusted % Ideal Body Weight (Calculated): 95.2  Adjusted BMI (kg/m2) (Calculated): 22.4  BMI Categories: Normal Weight (BMI 18.5-24.9)    Estimated Daily Nutrient Needs:  Energy Requirements Based On: Kcal/kg (25-30 kcals/kg)  Weight Used for Energy Requirements: Current (68 kg)  Energy (kcal/day): 0671-1832  Weight Used for Protein Requirements: Current (1-1.2 g/kg)  Protein (g/day): 68-82 g  Method Used

## 2024-02-22 NOTE — PLAN OF CARE
Problem: Discharge Planning  Goal: Discharge to home or other facility with appropriate resources  Outcome: Progressing     Problem: Pain  Goal: Verbalizes/displays adequate comfort level or baseline comfort level  Outcome: Progressing     Problem: Safety - Adult  Goal: Free from fall injury  Outcome: Progressing     Problem: Metabolic/Fluid and Electrolytes - Adult  Goal: Glucose maintained within prescribed range  Outcome: Progressing

## 2024-02-22 NOTE — CARE COORDINATION
Chart reviewed day 13. Care provided by vascular, podiatry and IM. Cert is approved through 2/27 for pt to go to [facility]. Pt had R BKA Monday and will have cast removed for stump eval today. Pt is afebrile today. Will continue to follow course for needs. Katharine Ba RN

## 2024-02-22 NOTE — PROGRESS NOTES
Occupational Therapy  Facility/Department: [facility] - MED SURG  Daily Treatment Note  NAME: Jackson Martínez  : 1950  MRN: [id number]    Date of Service: 2024    Discharge Recommendations:  Subacute/Skilled Nursing Facility  OT Equipment Recommendations  Other: Defer to next level of care      Patient Diagnosis(es): The primary encounter diagnosis was Severe sepsis (HCC). Diagnoses of Osteomyelitis of third toe of right foot (HCC), Cellulitis of right foot, Gangrene of right foot (HCC), and Gangrene (HCC) were also pertinent to this visit.     Assessment    Assessment: Patient demonstrates significant weakness for UE exercises and core strengthening exercises.  He requires two person assist for out of bed activity and was fatigued with vital signs WNL at end of session.  Patient requires intensive skilled nursing stay to maximize independence.  Activity Tolerance: Patient limited by fatigue;Patient limited by pain;Treatment limited secondary to decreased cognition  Discharge Recommendations: Subacute/Skilled Nursing Facility  Other: Defer to next level of care      Plan   Occupational Therapy Plan  Times Per Week: 3-5x/wk in acute  Current Treatment Recommendations: Strengthening;Balance training;Functional mobility training;Endurance training;Pain management;Safety education & training;Patient/Caregiver education & training;Equipment evaluation, education, & procurement;Positioning;Self-Care / ADL;Home management training;Co-Treatment     Restrictions  Restrictions/Precautions  Restrictions/Precautions: Up as Tolerated;Fall Risk  Required Braces or Orthoses?: No  Lower Extremity Weight Bearing Restrictions  Right Lower Extremity Weight Bearing: Non Weight Bearing  Position Activity Restriction  Other position/activity restrictions: RLE IPOP    Subjective   Subjective  Subjective: Patient in bed, finishing with dressing change with RN, agreeable to therapy, states that he doesn't want to therapy,

## 2024-02-23 VITALS
HEART RATE: 78 BPM | OXYGEN SATURATION: 97 % | TEMPERATURE: 98.4 F | BODY MASS INDEX: 22.22 KG/M2 | SYSTOLIC BLOOD PRESSURE: 128 MMHG | WEIGHT: 150 LBS | DIASTOLIC BLOOD PRESSURE: 72 MMHG | RESPIRATION RATE: 18 BRPM | HEIGHT: 69 IN

## 2024-02-23 LAB
ANION GAP SERPL CALCULATED.3IONS-SCNC: 8 MMOL/L (ref 3–16)
BASOPHILS # BLD: 0.1 K/UL (ref 0–0.2)
BASOPHILS NFR BLD: 1.3 %
BUN SERPL-MCNC: 20 MG/DL (ref 7–20)
CALCIUM SERPL-MCNC: 8.4 MG/DL (ref 8.3–10.6)
CHLORIDE SERPL-SCNC: 100 MMOL/L (ref 99–110)
CO2 SERPL-SCNC: 26 MMOL/L (ref 21–32)
CREAT SERPL-MCNC: <0.5 MG/DL (ref 0.8–1.3)
DEPRECATED RDW RBC AUTO: 16.1 % (ref 12.4–15.4)
EOSINOPHIL # BLD: 0.1 K/UL (ref 0–0.6)
EOSINOPHIL NFR BLD: 0.6 %
GFR SERPLBLD CREATININE-BSD FMLA CKD-EPI: >60 ML/MIN/{1.73_M2}
GLUCOSE BLD-MCNC: 166 MG/DL (ref 70–99)
GLUCOSE BLD-MCNC: 200 MG/DL (ref 70–99)
GLUCOSE SERPL-MCNC: 170 MG/DL (ref 70–99)
HCT VFR BLD AUTO: 26.3 % (ref 40.5–52.5)
HGB BLD-MCNC: 8.8 G/DL (ref 13.5–17.5)
LYMPHOCYTES # BLD: 1.8 K/UL (ref 1–5.1)
LYMPHOCYTES NFR BLD: 17.8 %
MCH RBC QN AUTO: 30.3 PG (ref 26–34)
MCHC RBC AUTO-ENTMCNC: 33.6 G/DL (ref 31–36)
MCV RBC AUTO: 90.2 FL (ref 80–100)
MONOCYTES # BLD: 0.9 K/UL (ref 0–1.3)
MONOCYTES NFR BLD: 9.1 %
NEUTROPHILS # BLD: 7.3 K/UL (ref 1.7–7.7)
NEUTROPHILS NFR BLD: 71.2 %
PERFORMED ON: ABNORMAL
PERFORMED ON: ABNORMAL
PLATELET # BLD AUTO: 634 K/UL (ref 135–450)
PMV BLD AUTO: 6.7 FL (ref 5–10.5)
POTASSIUM SERPL-SCNC: 4.3 MMOL/L (ref 3.5–5.1)
RBC # BLD AUTO: 2.92 M/UL (ref 4.2–5.9)
SODIUM SERPL-SCNC: 134 MMOL/L (ref 136–145)
WBC # BLD AUTO: 10.2 K/UL (ref 4–11)

## 2024-02-23 PROCEDURE — 6370000000 HC RX 637 (ALT 250 FOR IP): Performed by: INTERNAL MEDICINE

## 2024-02-23 PROCEDURE — 2580000003 HC RX 258: Performed by: PODIATRIST

## 2024-02-23 PROCEDURE — 36415 COLL VENOUS BLD VENIPUNCTURE: CPT

## 2024-02-23 PROCEDURE — 6370000000 HC RX 637 (ALT 250 FOR IP): Performed by: PODIATRIST

## 2024-02-23 PROCEDURE — 94640 AIRWAY INHALATION TREATMENT: CPT

## 2024-02-23 PROCEDURE — 80048 BASIC METABOLIC PNL TOTAL CA: CPT

## 2024-02-23 PROCEDURE — 85025 COMPLETE CBC W/AUTO DIFF WBC: CPT

## 2024-02-23 RX ORDER — GABAPENTIN 300 MG/1
300 CAPSULE ORAL 3 TIMES DAILY
Qty: 90 CAPSULE | Refills: 0 | Status: SHIPPED | OUTPATIENT
Start: 2024-02-23 | End: 2024-03-24

## 2024-02-23 RX ORDER — OXYCODONE HYDROCHLORIDE AND ACETAMINOPHEN 5; 325 MG/1; MG/1
2 TABLET ORAL EVERY 4 HOURS PRN
Qty: 28 TABLET | Refills: 0 | Status: SHIPPED | OUTPATIENT
Start: 2024-02-23 | End: 2024-03-01

## 2024-02-23 RX ADMIN — OXYCODONE AND ACETAMINOPHEN 2 TABLET: 5; 325 TABLET ORAL at 08:48

## 2024-02-23 RX ADMIN — SODIUM CHLORIDE, PRESERVATIVE FREE 10 ML: 5 INJECTION INTRAVENOUS at 08:48

## 2024-02-23 RX ADMIN — Medication 2 PUFF: at 08:40

## 2024-02-23 RX ADMIN — OXYCODONE AND ACETAMINOPHEN 2 TABLET: 5; 325 TABLET ORAL at 04:16

## 2024-02-23 RX ADMIN — GABAPENTIN 300 MG: 300 CAPSULE ORAL at 08:48

## 2024-02-23 RX ADMIN — OXYCODONE AND ACETAMINOPHEN 2 TABLET: 5; 325 TABLET ORAL at 00:17

## 2024-02-23 ASSESSMENT — PAIN DESCRIPTION - ORIENTATION
ORIENTATION: RIGHT

## 2024-02-23 ASSESSMENT — PAIN SCALES - GENERAL
PAINLEVEL_OUTOF10: 8

## 2024-02-23 ASSESSMENT — PAIN - FUNCTIONAL ASSESSMENT: PAIN_FUNCTIONAL_ASSESSMENT: ACTIVITIES ARE NOT PREVENTED

## 2024-02-23 ASSESSMENT — PAIN DESCRIPTION - LOCATION
LOCATION: LEG
LOCATION: LEG
LOCATION: FOOT

## 2024-02-23 ASSESSMENT — PAIN DESCRIPTION - DESCRIPTORS: DESCRIPTORS: DISCOMFORT

## 2024-02-23 NOTE — DISCHARGE INSTR - COC
Continuity of Care Form    Patient Name: Jackson Martínez   :  1950  MRN:  [id number]    Admit date:  2024  Discharge date:  24    Code Status Order: Full Code   Advance Directives:   Advance Care Flowsheet Documentation       Date/Time Healthcare Directive Type of Healthcare Directive Copy in Chart Healthcare Agent Appointed Healthcare Agent's Name Healthcare Agent's Phone Number    24 0954 No, patient does not have an advance directive for healthcare treatment -- -- -- -- --    24 1002 No, patient does not have an advance directive for healthcare treatment -- Yes, copy in chart -- -- --            Admitting Physician:  Ry Hardin MD  PCP: No primary care provider on file.    Discharging Nurse: Wendie Zuñiga RN   Discharging Hospital Unit/Room#: 0105/0105-01  Discharging Unit Phone Number: [phone]    Emergency Contact:   Extended Emergency Contact Information  Primary Emergency Contact: Savi Martínez  Address: [address] [place] [address] [address]  Home Phone: [phone]  Relation: Spouse  Secondary Emergency Contact: None,To List  Relation: Other    Past Surgical History:  Past Surgical History:   Procedure Laterality Date    ADENOIDECTOMY      FOOT DEBRIDEMENT Right 2024    RIGHT FOOT DEBRIDEMENT INCISION AND DRAINAGE OF MULTPILE SITES WITH RIGHT THIRD TOE AMPUTATION performed by Florinda Slater DPM at [facility] OR    FOOT SURGERY Right 2024    RIGHT LOWER EXTREMITY GUILLOTINE AMPUTATION performed by Agus Montalvo MD at [facility] OR    LEG AMPUTATION BELOW KNEE Right 2024    RIGHT BELOW THE KNEE AMPUTATION WITH IMMEDIATE POST OP PROSTHESIS performed by Agus Montalvo MD at [facility] OR    NASAL SEPTUM SURGERY      SPLENECTOMY      TONSILLECTOMY         Immunization History:     There is no immunization history on file for this patient.    Active Problems:  Patient Active Problem List   Diagnosis Code    Sepsis

## 2024-02-23 NOTE — CARE COORDINATION
CASE MANAGEMENT DISCHARGE SUMMARY      Discharge to: [facility]    Precertification completed: yes  Hospital Exemption Notification (HENS) completed: Document ID : [id number]     IMM given: 2/22/24    New Durable Medical Equipment ordered/agency: NA    Transportation:       Medical Transport explained to pt/family. Pt/family voice no agency preference.    Agency used: [facility]   time:1215   Ambulance form completed: Yes    Confirmed discharge plan with:     Patient: yes     Family:  yes, wife     Facility/Agency, name:  [facility] to obtain from Epic   Phone number for report to facility: [phone]     RN, name: Wendie    Note: Discharging nurse to complete [facility], reconcile [facility], and place final copy with patient's discharge packet. RN to ensure that written prescriptions for  Level II medications are sent with patient to the facility as per protocol.

## 2024-02-23 NOTE — PROGRESS NOTES
Hospital Medicine Progress Note      Date of Admission: 2/9/2024  Hospital Day: 15    Chief Admission Complaint:  rt foot pain     Subjective:  Pain not well controlled on current regimen.     Presenting Admission History:       Jackson Martínez is a 73 y.o. male with pmh of tobacco abuse who presents with ongoing right foot pain with wound and drainage, found to be in severe sepsis.      In the ER, patient was hypertensive on arrival, tachycardic 110s. XR right foot on arrival showing bony resorption of 3rd toe suspicious for osteomyelitis with soft tissue swelling and neg soft tissue gas. Labwork significant for Lactate 2.8, .8, , leukocytosis of 29.4, Hgb of 9.2. Received sepsis bolus, IV Vanc and Zosyn x 1. Patient admitted to the floor for continuous monitoring, IV meds and fluids, specialty evaluation.      Patient states his symptoms started 2 weeks ago when he cut his right third toenail with a clipper and developed a wound. Has had progressively worsening pain, drainage, bleeding, and difficulty bearing weight on his right foot due to the pain.  Did not seek care at an urgent care.  Has only taken Tylenol or Advil at home as needed for pain.  He denies prior history of similar wounds, diabetes, neuropathy, history of hypertension or hyperlipidemia. Mentions he had a \"pain pill problem\" years ago.  He denies recent fever, chills, chest pain, SOB, abdominal pain, nausea or vomiting, diarrhea constipation, hematuria or dysuria, bloody stools, headaches, dizziness, acute vision or hearing changes, LOC, numbness or tingling.        Assessment/Plan:      Sepsis secondary osteomyelitis, now s/p BKA  X-ray of foot was consistent with osteomyelitis.  Podiatry and ID were consulted, apprec recs and mgmt  Follow-up MRI for surgical planning(noted severe infectious change, multifocal OM/septic arthritis, suspected myonecrosis)  IV antibiotics: Vancomycin plus cefepime plus Flagyl, stopped on 2/15 as

## 2024-02-23 NOTE — PROGRESS NOTES
Report given to KAVON Henley at [facility]. Report also given to EMS.     Pt d/c'd to [facility].  Removed peripheral IV and stopped bleeding.  Catheter intact. Pt tolerated well. No redness noted at site.  Reviewed d/c instructions, home meds, and  f/u information utilizing teach-back method.  Scripts for Percocet and Gabapentin given to EMS in security bag. Verified scripts with second RNAndria. Patient verbalized understanding. Electronically signed by Wendie Zuñiga RN on 2/23/2024 at 12:27 PM

## 2024-02-26 LAB
FUNGUS SPEC CULT: NORMAL
LOEFFLER MB STN SPEC: NORMAL

## 2024-02-27 LAB
ACID FAST STN SPEC QL: NORMAL
MYCOBACTERIUM SPEC CULT: NORMAL

## 2024-03-04 LAB
FUNGUS SPEC CULT: NORMAL
LOEFFLER MB STN SPEC: NORMAL

## 2024-03-05 LAB
ACID FAST STN SPEC QL: NORMAL
MYCOBACTERIUM SPEC CULT: NORMAL

## 2024-03-11 LAB
FUNGUS SPEC CULT: NORMAL
LOEFFLER MB STN SPEC: NORMAL

## 2024-03-12 LAB
ACID FAST STN SPEC QL: NORMAL
MYCOBACTERIUM SPEC CULT: NORMAL

## 2024-03-18 LAB
FUNGUS SPEC CULT: NORMAL
LOEFFLER MB STN SPEC: NORMAL

## 2024-03-19 LAB
ACID FAST STN SPEC QL: NORMAL
MYCOBACTERIUM SPEC CULT: NORMAL

## 2024-03-22 ENCOUNTER — TELEPHONE (OUTPATIENT)
Dept: VASCULAR SURGERY | Age: 74
End: 2024-03-22

## 2024-03-22 NOTE — TELEPHONE ENCOUNTER
Called again as had to reschedule todays apt due to Dr Montalvo needing to do emergent surgery. Pamjose

## 2024-03-22 NOTE — TELEPHONE ENCOUNTER
Called patient to move apt as cannot be seen today due to Dr Montalvo needed to do emerg surgery this afternoon. Need to move to next wed. Could not leave message as mailbox is full. Pamjose

## 2024-03-26 LAB
ACID FAST STN SPEC QL: NORMAL
MYCOBACTERIUM SPEC CULT: NORMAL

## 2024-04-02 LAB
ACID FAST STN SPEC QL: NORMAL
MYCOBACTERIUM SPEC CULT: NORMAL

## 2024-04-03 ENCOUNTER — OFFICE VISIT (OUTPATIENT)
Dept: VASCULAR SURGERY | Age: 74
End: 2024-04-03

## 2024-04-03 VITALS
SYSTOLIC BLOOD PRESSURE: 150 MMHG | HEIGHT: 69 IN | BODY MASS INDEX: 22.22 KG/M2 | DIASTOLIC BLOOD PRESSURE: 70 MMHG | WEIGHT: 150 LBS

## 2024-04-03 DIAGNOSIS — Z09 POSTOPERATIVE EXAMINATION: Primary | ICD-10-CM

## 2024-04-03 PROCEDURE — 99024 POSTOP FOLLOW-UP VISIT: CPT | Performed by: SURGERY

## 2024-04-03 NOTE — PROGRESS NOTES
Postop Progress Note    Subjective    Jackson Martínez presents to the office for postop follow up.    Objective    Vitals:    04/03/24 1611   BP: (!) 150/70     General: alert, cooperative and no distress  R BK stump well healed.      Assessment  Doing well postoperatively.    Plan  All staples removed.  Can start wt bearing and be fitted for prosthesis.     Electronically signed by Agus Montalvo MD on 4/3/2024 at 4:35 PM

## 2024-04-25 ENCOUNTER — TELEPHONE (OUTPATIENT)
Dept: VASCULAR SURGERY | Age: 74
End: 2024-04-25

## 2024-04-25 NOTE — TELEPHONE ENCOUNTER
Patient's Savi is calling to speak with [facility]. She states that Mr. Martínez has been coming of pain in his leg. He states the cast that he is wearing is uncomfortable. The pain is present with and without the cast. She is wondering if he can have a prescription for pain medication. I informed her that Blessing is out of the office until tomorrow. Patient and patient's wife are ok with a returned call tomorrow. He stated that he would take ibuprofen (as he has been) until tomorrow.     Patients callback number is [phone]

## 2024-04-26 ENCOUNTER — TELEPHONE (OUTPATIENT)
Dept: VASCULAR SURGERY | Age: 74
End: 2024-04-26

## 2024-04-26 NOTE — TELEPHONE ENCOUNTER
Returned call to patients spouse regarding prosthesis questions. No answer and no voicemail set up. These are questions that need to addressed with [employer]. Pamlr

## 2024-05-03 ENCOUNTER — TELEPHONE (OUTPATIENT)
Dept: VASCULAR SURGERY | Age: 74
End: 2024-05-03

## 2024-05-03 NOTE — TELEPHONE ENCOUNTER
Returned call to spouse, Savi as she had some questions regarding the patient. She did not answer and vm box is full. Nevaeh

## 2024-05-03 NOTE — TELEPHONE ENCOUNTER
Patient's wife called and would like to speak with you. Please give her a call at your convenience.

## 2024-11-25 ENCOUNTER — TELEPHONE (OUTPATIENT)
Dept: VASCULAR SURGERY | Age: 74
End: 2024-11-25

## 2024-11-25 NOTE — TELEPHONE ENCOUNTER
Returned call to patient to schedule an apt to see Dr Montalvo. No answer and could not leave a message as mailbox was full. Nevaeh

## (undated) DEVICE — STAPLER EXT SKIN 35 WIDE S STL STPL SQUEEZE HNDL VISISTAT

## (undated) DEVICE — BLADE ES L2.75IN ELASTOMERIC COAT DURABLE BEND UPTO 90DEG

## (undated) DEVICE — BANDAGE COMPR W4INXL15FT BGE E SGL LAYERED CLP CLSR

## (undated) DEVICE — DRESSING,GAUZE,PETROLATUM,CURAD,3"X9",ST: Brand: CURAD

## (undated) DEVICE — SOLUTION IRRIG 2000ML 0.9% SOD CHL USP UROMATIC PLAS CONT

## (undated) DEVICE — YANKAUER,OPEN TIP,W/O VENT,STERILE: Brand: MEDLINE INDUSTRIES, INC.

## (undated) DEVICE — SUTURE VCRL + SZ 2-0 L18IN ABSRB UD CT1 L36MM 1/2 CIR VCP839D

## (undated) DEVICE — DUAL CUT SAGITTAL BLADE

## (undated) DEVICE — PRECISION THIN (9.0 X 0.38 X 25.0MM)

## (undated) DEVICE — ZIMMER® STERILE DISPOSABLE TOURNIQUET CUFF WITH PLC, DUAL PORT, SINGLE BLADDER, 18 IN. (46 CM)

## (undated) DEVICE — SUTURE VCRL + SZ 2-0 L18IN ABSRB CLR TIE POLYGLACTIN 910 VCP111G

## (undated) DEVICE — HANDPIECE SET WITH HIGH FLOW TIP AND SUCTION TUBE: Brand: INTERPULSE

## (undated) DEVICE — ZIMMER® STERILE DISPOSABLE TOURNIQUET CUFF WITH PLC, DUAL PORT, SINGLE BLADDER, 24 IN. (61 CM)

## (undated) DEVICE — STOCKINETTE,IMPERVIOUS,12X48,STERILE: Brand: MEDLINE

## (undated) DEVICE — KERLIX STER GAUZ 225INX3YDS/RL

## (undated) DEVICE — BASIC SINGLE BASIN 1-LF: Brand: MEDLINE INDUSTRIES, INC.

## (undated) DEVICE — COVER XR CASS W20XL41IN UNIV ADH STRP

## (undated) DEVICE — PACK EXTREMITY XR

## (undated) DEVICE — SOLUTION IRRIG 1000ML 0.9% SOD CHL USP POUR PLAS BTL

## (undated) DEVICE — SUTURE ETHLN SZ 3-0 L30IN NONABSORBABLE BLK FSL L30MM 3/8 1671H

## (undated) DEVICE — GLOVE SURG SZ 65 THK91MIL LTX FREE SYN POLYISOPRENE

## (undated) DEVICE — GLOVE SURG SZ 8 L11.77IN FNGR THK9.8MIL STRW LTX POLYMER

## (undated) DEVICE — LOWER EXTREMITY: Brand: MEDLINE INDUSTRIES, INC.

## (undated) DEVICE — GAUZE,PACKING STRIP,IODOFORM,1/2"X5YD,ST: Brand: CURAD